# Patient Record
Sex: FEMALE | Race: WHITE | Employment: OTHER | URBAN - METROPOLITAN AREA
[De-identification: names, ages, dates, MRNs, and addresses within clinical notes are randomized per-mention and may not be internally consistent; named-entity substitution may affect disease eponyms.]

---

## 2021-12-23 ENCOUNTER — APPOINTMENT (OUTPATIENT)
Dept: ULTRASOUND IMAGING | Age: 71
End: 2021-12-23
Payer: MEDICARE

## 2021-12-23 ENCOUNTER — APPOINTMENT (OUTPATIENT)
Dept: GENERAL RADIOLOGY | Age: 71
End: 2021-12-23
Payer: MEDICARE

## 2021-12-23 ENCOUNTER — APPOINTMENT (OUTPATIENT)
Dept: CT IMAGING | Age: 71
End: 2021-12-23
Payer: MEDICARE

## 2021-12-23 ENCOUNTER — HOSPITAL ENCOUNTER (OUTPATIENT)
Age: 71
Setting detail: OBSERVATION
Discharge: NURSING, REHAB OR LONG-TERM CARE | End: 2021-12-27
Attending: STUDENT IN AN ORGANIZED HEALTH CARE EDUCATION/TRAINING PROGRAM | Admitting: HOSPITALIST
Payer: MEDICARE

## 2021-12-23 DIAGNOSIS — N30.00 ACUTE CYSTITIS WITHOUT HEMATURIA: ICD-10-CM

## 2021-12-23 DIAGNOSIS — R55 SYNCOPE AND COLLAPSE: Primary | ICD-10-CM

## 2021-12-23 LAB
ALBUMIN SERPL-MCNC: 2.9 GM/DL (ref 3.4–5)
ALP BLD-CCNC: 46 IU/L (ref 40–129)
ALT SERPL-CCNC: 10 U/L (ref 10–40)
ANION GAP SERPL CALCULATED.3IONS-SCNC: 8 MMOL/L (ref 4–16)
AST SERPL-CCNC: 13 IU/L (ref 15–37)
BASOPHILS ABSOLUTE: 0.1 K/CU MM
BASOPHILS RELATIVE PERCENT: 0.9 % (ref 0–1)
BILIRUB SERPL-MCNC: 0.4 MG/DL (ref 0–1)
BUN BLDV-MCNC: 12 MG/DL (ref 6–23)
CALCIUM SERPL-MCNC: 9 MG/DL (ref 8.3–10.6)
CHLORIDE BLD-SCNC: 104 MMOL/L (ref 99–110)
CO2: 23 MMOL/L (ref 21–32)
CREAT SERPL-MCNC: 0.9 MG/DL (ref 0.6–1.1)
DIFFERENTIAL TYPE: ABNORMAL
EOSINOPHILS ABSOLUTE: 0.1 K/CU MM
EOSINOPHILS RELATIVE PERCENT: 1.8 % (ref 0–3)
GFR AFRICAN AMERICAN: >60 ML/MIN/1.73M2
GFR NON-AFRICAN AMERICAN: >60 ML/MIN/1.73M2
GLUCOSE BLD-MCNC: 97 MG/DL (ref 70–99)
HCT VFR BLD CALC: 38.7 % (ref 37–47)
HEMOGLOBIN: 12.3 GM/DL (ref 12.5–16)
IMMATURE NEUTROPHIL %: 0.3 % (ref 0–0.43)
LACTATE: 1.1 MMOL/L (ref 0.4–2)
LYMPHOCYTES ABSOLUTE: 2.5 K/CU MM
LYMPHOCYTES RELATIVE PERCENT: 33.2 % (ref 24–44)
MCH RBC QN AUTO: 29.5 PG (ref 27–31)
MCHC RBC AUTO-ENTMCNC: 31.8 % (ref 32–36)
MCV RBC AUTO: 92.8 FL (ref 78–100)
MONOCYTES ABSOLUTE: 0.6 K/CU MM
MONOCYTES RELATIVE PERCENT: 7.4 % (ref 0–4)
NUCLEATED RBC %: 0 %
PDW BLD-RTO: 12.7 % (ref 11.7–14.9)
PLATELET # BLD: 214 K/CU MM (ref 140–440)
PMV BLD AUTO: 9.7 FL (ref 7.5–11.1)
POTASSIUM SERPL-SCNC: 3.8 MMOL/L (ref 3.5–5.1)
PRO-BNP: 1025 PG/ML
RBC # BLD: 4.17 M/CU MM (ref 4.2–5.4)
SEGMENTED NEUTROPHILS ABSOLUTE COUNT: 4.3 K/CU MM
SEGMENTED NEUTROPHILS RELATIVE PERCENT: 56.4 % (ref 36–66)
SODIUM BLD-SCNC: 135 MMOL/L (ref 135–145)
TOTAL IMMATURE NEUTOROPHIL: 0.02 K/CU MM
TOTAL NUCLEATED RBC: 0 K/CU MM
TOTAL PROTEIN: 5.9 GM/DL (ref 6.4–8.2)
TROPONIN T: <0.01 NG/ML
TSH HIGH SENSITIVITY: 0.04 UIU/ML (ref 0.27–4.2)
WBC # BLD: 7.6 K/CU MM (ref 4–10.5)

## 2021-12-23 PROCEDURE — 85025 COMPLETE CBC W/AUTO DIFF WBC: CPT

## 2021-12-23 PROCEDURE — 84443 ASSAY THYROID STIM HORMONE: CPT

## 2021-12-23 PROCEDURE — 72125 CT NECK SPINE W/O DYE: CPT

## 2021-12-23 PROCEDURE — 96372 THER/PROPH/DIAG INJ SC/IM: CPT

## 2021-12-23 PROCEDURE — G0378 HOSPITAL OBSERVATION PER HR: HCPCS

## 2021-12-23 PROCEDURE — 70450 CT HEAD/BRAIN W/O DYE: CPT

## 2021-12-23 PROCEDURE — 87040 BLOOD CULTURE FOR BACTERIA: CPT

## 2021-12-23 PROCEDURE — 83880 ASSAY OF NATRIURETIC PEPTIDE: CPT

## 2021-12-23 PROCEDURE — 84484 ASSAY OF TROPONIN QUANT: CPT

## 2021-12-23 PROCEDURE — 93880 EXTRACRANIAL BILAT STUDY: CPT

## 2021-12-23 PROCEDURE — 6360000002 HC RX W HCPCS: Performed by: HOSPITALIST

## 2021-12-23 PROCEDURE — 99285 EMERGENCY DEPT VISIT HI MDM: CPT

## 2021-12-23 PROCEDURE — 80053 COMPREHEN METABOLIC PANEL: CPT

## 2021-12-23 PROCEDURE — 83605 ASSAY OF LACTIC ACID: CPT

## 2021-12-23 PROCEDURE — 93005 ELECTROCARDIOGRAM TRACING: CPT | Performed by: PHYSICIAN ASSISTANT

## 2021-12-23 PROCEDURE — 71045 X-RAY EXAM CHEST 1 VIEW: CPT

## 2021-12-23 RX ORDER — ASPIRIN 81 MG/1
81 TABLET ORAL DAILY
COMMUNITY

## 2021-12-23 RX ORDER — ACETAMINOPHEN 650 MG/1
650 SUPPOSITORY RECTAL EVERY 6 HOURS PRN
Status: DISCONTINUED | OUTPATIENT
Start: 2021-12-23 | End: 2021-12-27 | Stop reason: HOSPADM

## 2021-12-23 RX ORDER — ONDANSETRON 2 MG/ML
4 INJECTION INTRAMUSCULAR; INTRAVENOUS EVERY 6 HOURS PRN
Status: DISCONTINUED | OUTPATIENT
Start: 2021-12-23 | End: 2021-12-27 | Stop reason: HOSPADM

## 2021-12-23 RX ORDER — SODIUM CHLORIDE 9 MG/ML
25 INJECTION, SOLUTION INTRAVENOUS PRN
Status: DISCONTINUED | OUTPATIENT
Start: 2021-12-23 | End: 2021-12-27 | Stop reason: HOSPADM

## 2021-12-23 RX ORDER — SODIUM CHLORIDE 0.9 % (FLUSH) 0.9 %
5-40 SYRINGE (ML) INJECTION EVERY 12 HOURS SCHEDULED
Status: DISCONTINUED | OUTPATIENT
Start: 2021-12-23 | End: 2021-12-27 | Stop reason: HOSPADM

## 2021-12-23 RX ORDER — POLYETHYLENE GLYCOL 3350 17 G/17G
17 POWDER, FOR SOLUTION ORAL DAILY PRN
Status: DISCONTINUED | OUTPATIENT
Start: 2021-12-23 | End: 2021-12-27 | Stop reason: HOSPADM

## 2021-12-23 RX ORDER — ONDANSETRON 4 MG/1
4 TABLET, ORALLY DISINTEGRATING ORAL EVERY 8 HOURS PRN
Status: DISCONTINUED | OUTPATIENT
Start: 2021-12-23 | End: 2021-12-27 | Stop reason: HOSPADM

## 2021-12-23 RX ORDER — ACETAMINOPHEN 325 MG/1
650 TABLET ORAL EVERY 6 HOURS PRN
Status: DISCONTINUED | OUTPATIENT
Start: 2021-12-23 | End: 2021-12-27 | Stop reason: HOSPADM

## 2021-12-23 RX ORDER — TRAZODONE HYDROCHLORIDE 50 MG/1
50 TABLET ORAL NIGHTLY
COMMUNITY

## 2021-12-23 RX ORDER — PRAVASTATIN SODIUM 40 MG
40 TABLET ORAL DAILY
COMMUNITY

## 2021-12-23 RX ORDER — ASPIRIN 81 MG/1
81 TABLET ORAL DAILY
Status: DISCONTINUED | OUTPATIENT
Start: 2021-12-24 | End: 2021-12-27 | Stop reason: HOSPADM

## 2021-12-23 RX ORDER — TRAZODONE HYDROCHLORIDE 50 MG/1
50 TABLET ORAL NIGHTLY PRN
Status: DISCONTINUED | OUTPATIENT
Start: 2021-12-23 | End: 2021-12-27 | Stop reason: HOSPADM

## 2021-12-23 RX ORDER — PRAVASTATIN SODIUM 10 MG
40 TABLET ORAL NIGHTLY
Status: DISCONTINUED | OUTPATIENT
Start: 2021-12-24 | End: 2021-12-27 | Stop reason: HOSPADM

## 2021-12-23 RX ORDER — SODIUM CHLORIDE 0.9 % (FLUSH) 0.9 %
5-40 SYRINGE (ML) INJECTION PRN
Status: DISCONTINUED | OUTPATIENT
Start: 2021-12-23 | End: 2021-12-27 | Stop reason: HOSPADM

## 2021-12-23 RX ORDER — LEVOTHYROXINE SODIUM 137 UG/1
137 TABLET ORAL DAILY
Status: ON HOLD | COMMUNITY
End: 2021-12-27 | Stop reason: HOSPADM

## 2021-12-23 RX ADMIN — ENOXAPARIN SODIUM 40 MG: 100 INJECTION SUBCUTANEOUS at 19:58

## 2021-12-23 ASSESSMENT — ENCOUNTER SYMPTOMS
STRIDOR: 0
COUGH: 0
ABDOMINAL PAIN: 0
BACK PAIN: 0
RHINORRHEA: 0
PHOTOPHOBIA: 0
VOMITING: 1
SHORTNESS OF BREATH: 0

## 2021-12-23 NOTE — H&P
History and Physical      Name:  Sariah Oconnor /Age/Sex: 1950  (73 y.o. female)   MRN & CSN:  [id number] & [id number] Admission Date/Time: 2021  2:49 PM   Location:  [place] [address] PCP: No primary care provider on file. Hospital Day: 1    Assessment and Plan:   Sariah Oconnor is a 73 y.o.  female  who presents with syncope    1. Syncope may be vasovagal/orthostatic hypotension/possible arrhythmias  Systolic blood pressure noted to be in the 80s prior to presentation  Received IV fluid bolus on the ER--blood pressure currently stable  CTA reviewed-no acute findings noted, troponin negative  -2D echocardiogram ordered  -Hold off further IV fluids due to slight interstitial edema noted on chest x-ray  -Continue telemetry monitoring  -Monitor orthostatic blood pressure     2. Generalized weakness may be physical deconditioning  UA pending. PT/OT evaluation    3. Hypothyroidism-continue levothyroxine    4. Hyperlipidemia-continue statins    Diet ADULT DIET; Regular   DVT Prophylaxis [x] Lovenox, []  Heparin, [] SCDs, [] Ambulation   GI Prophylaxis [] PPI,  [x] H2 Blocker,  [] Carafate,  [] Diet/Tube Feeds   Code Status Full Code   Disposition Patient requires continued admission due to syncope   MDM [x] Low, [] Moderate,[]  High       History of Present Illness:     Chief Complaint:    Sariah Oconnor is a 73 y.o.  female with past medical history of hypothyroidism, hyperlipidemia, currently in a skilled nursing facility who was brought to the emergency room due to syncope. Patient complains of generalized weakness which started few days ago and progressively worse with associated nausea. Patient states she was smoking cigarettes, became lethargic passed out for few minutes which was witnessed. She complains of a slight cough but denies any chest pain, shortness of breath, fever/chills, palpitations, diaphoresis, headaches. EMS noted patient was hypotensive with blood pressures in the 80s.   She denies similar episodes in the past.  At the emergency room, vital signs were stable, CTA and CT cervical spine showed no acute findings, chest x-ray showed cardiomegaly with mild interstitial edema/questionable small left pleural effusion. She will be admitted under observation for further evaluation. Ten point ROS reviewed negative, unless as noted above    Objective:   No intake or output data in the 24 hours ending 21 1847   Vitals:   Vitals:    21 1832   BP: 107/62   Pulse: 63   Resp: 16   Temp:    SpO2:      Physical Exam:   GEN Awake female,  no apparent distress. EYES Pupils are equally round. No scleral erythema  HENT Mucous membranes are moist. Oral pharynx without exudates  NECK Supple  RESP Slight crackles at the lung , no wheezes, rales or rhonchi. CARDIO/VASC S1/S2 auscultated. Regular rate, No murmurs, rubs, or gallops. Peripheral pulses equal bilaterally and palpable. No peripheral edema. GI Abdomen is soft without significant tenderness, masses, or guarding. Bowel sounds present   No costovertebral angle tenderness. HEME/LYMPH No hepatosplenomegaly. MSK No gross joint deformities. SKIN Normal coloration, warm, dry. NEURO Cranial nerves appear grossly intact, normal speech, no lateralizing weakness. PSYCH Awake, alert, oriented x 4. Past Medical History:      Past Medical History:   Diagnosis Date    Hyperlipidemia     Insomnia     Thyroid disease     hypo     PSHX:  has a past surgical history that includes  section and brain surgery. Allergies: Allergies   Allergen Reactions    Epinephrine Hives    Tartrazine Itching    Dye [Iodides] Hives    Other      Phenergen       FAM HX: family history is not on file.   Soc HX:   Social History     Socioeconomic History    Marital status: Single     Spouse name: None    Number of children: None    Years of education: None    Highest education level: None   Occupational History    None   Tobacco Use  Smoking status: Current Every Day Smoker     Packs/day: 1.00     Types: Cigarettes    Smokeless tobacco: Never Used   Vaping Use    Vaping Use: Never used   Substance and Sexual Activity    Alcohol use: Not Currently    Drug use: Not Currently    Sexual activity: None       Medications:   Medications:    [START ON 12/24/2021] aspirin  81 mg Oral Daily    [START ON 12/24/2021] levothyroxine  137 mcg Oral Daily    [START ON 12/24/2021] pravastatin  40 mg Oral Daily    sodium chloride flush  5-40 mL IntraVENous 2 times per day    enoxaparin  40 mg SubCUTAneous Daily      Infusions:    sodium chloride       PRN Meds: traZODone, 50 mg, Nightly PRN  sodium chloride flush, 5-40 mL, PRN  sodium chloride, 25 mL, PRN  ondansetron, 4 mg, Q8H PRN   Or  ondansetron, 4 mg, Q6H PRN  polyethylene glycol, 17 g, Daily PRN  acetaminophen, 650 mg, Q6H PRN   Or  acetaminophen, 650 mg, Q6H PRN          Electronically signed by Oma Rai MD on 12/23/2021 at 6:47 PM

## 2021-12-23 NOTE — ED NOTES
Pt states she was outside and started feeling very \"lethargic\". Pt denies syncope or fall/injury.       Lalit Tan RN  12/23/21 9627

## 2021-12-24 LAB
AMORPHOUS: ABNORMAL /HPF
ANION GAP SERPL CALCULATED.3IONS-SCNC: 9 MMOL/L (ref 4–16)
BACTERIA: ABNORMAL /HPF
BILIRUBIN URINE: NEGATIVE MG/DL
BLOOD, URINE: ABNORMAL
BUN BLDV-MCNC: 12 MG/DL (ref 6–23)
CALCIUM SERPL-MCNC: 8.9 MG/DL (ref 8.3–10.6)
CHLORIDE BLD-SCNC: 107 MMOL/L (ref 99–110)
CLARITY: ABNORMAL
CO2: 23 MMOL/L (ref 21–32)
COLOR: YELLOW
CREAT SERPL-MCNC: 0.8 MG/DL (ref 0.6–1.1)
EKG ATRIAL RATE: 53 BPM
EKG DIAGNOSIS: NORMAL
EKG P AXIS: 11 DEGREES
EKG P-R INTERVAL: 154 MS
EKG Q-T INTERVAL: 440 MS
EKG QRS DURATION: 80 MS
EKG QTC CALCULATION (BAZETT): 412 MS
EKG R AXIS: 39 DEGREES
EKG T AXIS: 16 DEGREES
EKG VENTRICULAR RATE: 53 BPM
GFR AFRICAN AMERICAN: >60 ML/MIN/1.73M2
GFR NON-AFRICAN AMERICAN: >60 ML/MIN/1.73M2
GLUCOSE BLD-MCNC: 85 MG/DL (ref 70–99)
GLUCOSE, URINE: NEGATIVE MG/DL
HCT VFR BLD CALC: 40 % (ref 37–47)
HEMOGLOBIN: 13 GM/DL (ref 12.5–16)
KETONES, URINE: NEGATIVE MG/DL
LEUKOCYTE ESTERASE, URINE: ABNORMAL
MCH RBC QN AUTO: 29.3 PG (ref 27–31)
MCHC RBC AUTO-ENTMCNC: 32.5 % (ref 32–36)
MCV RBC AUTO: 90.1 FL (ref 78–100)
MUCUS: ABNORMAL HPF
NITRITE URINE, QUANTITATIVE: POSITIVE
PDW BLD-RTO: 12.7 % (ref 11.7–14.9)
PH, URINE: 7.5 (ref 5–8)
PLATELET # BLD: 215 K/CU MM (ref 140–440)
PMV BLD AUTO: 9.7 FL (ref 7.5–11.1)
POTASSIUM SERPL-SCNC: 4.2 MMOL/L (ref 3.5–5.1)
PROTEIN UA: NEGATIVE MG/DL
RBC # BLD: 4.44 M/CU MM (ref 4.2–5.4)
RBC URINE: 2 /HPF (ref 0–6)
SODIUM BLD-SCNC: 139 MMOL/L (ref 135–145)
SPECIFIC GRAVITY UA: 1.01 (ref 1–1.03)
T4 FREE: 2.24 NG/DL (ref 0.9–1.8)
TRICHOMONAS: ABNORMAL /HPF
UROBILINOGEN, URINE: NEGATIVE MG/DL (ref 0.2–1)
WBC # BLD: 6.5 K/CU MM (ref 4–10.5)
WBC UA: 2 /HPF (ref 0–5)

## 2021-12-24 PROCEDURE — G0378 HOSPITAL OBSERVATION PER HR: HCPCS

## 2021-12-24 PROCEDURE — 96365 THER/PROPH/DIAG IV INF INIT: CPT

## 2021-12-24 PROCEDURE — 97116 GAIT TRAINING THERAPY: CPT

## 2021-12-24 PROCEDURE — 6370000000 HC RX 637 (ALT 250 FOR IP): Performed by: HOSPITALIST

## 2021-12-24 PROCEDURE — 81001 URINALYSIS AUTO W/SCOPE: CPT

## 2021-12-24 PROCEDURE — 84439 ASSAY OF FREE THYROXINE: CPT

## 2021-12-24 PROCEDURE — 97530 THERAPEUTIC ACTIVITIES: CPT

## 2021-12-24 PROCEDURE — 2580000003 HC RX 258: Performed by: HOSPITALIST

## 2021-12-24 PROCEDURE — 94761 N-INVAS EAR/PLS OXIMETRY MLT: CPT

## 2021-12-24 PROCEDURE — 2580000003 HC RX 258: Performed by: NURSE PRACTITIONER

## 2021-12-24 PROCEDURE — 36415 COLL VENOUS BLD VENIPUNCTURE: CPT

## 2021-12-24 PROCEDURE — 96372 THER/PROPH/DIAG INJ SC/IM: CPT

## 2021-12-24 PROCEDURE — 80048 BASIC METABOLIC PNL TOTAL CA: CPT

## 2021-12-24 PROCEDURE — 6360000002 HC RX W HCPCS: Performed by: HOSPITALIST

## 2021-12-24 PROCEDURE — 85027 COMPLETE CBC AUTOMATED: CPT

## 2021-12-24 PROCEDURE — 93010 ELECTROCARDIOGRAM REPORT: CPT | Performed by: INTERNAL MEDICINE

## 2021-12-24 PROCEDURE — 6360000002 HC RX W HCPCS: Performed by: NURSE PRACTITIONER

## 2021-12-24 PROCEDURE — 97162 PT EVAL MOD COMPLEX 30 MIN: CPT

## 2021-12-24 RX ADMIN — ASPIRIN 81 MG: 81 TABLET, COATED ORAL at 08:58

## 2021-12-24 RX ADMIN — PRAVASTATIN SODIUM 40 MG: 10 TABLET ORAL at 21:24

## 2021-12-24 RX ADMIN — SODIUM CHLORIDE, PRESERVATIVE FREE 10 ML: 5 INJECTION INTRAVENOUS at 09:01

## 2021-12-24 RX ADMIN — ENOXAPARIN SODIUM 40 MG: 100 INJECTION SUBCUTANEOUS at 08:58

## 2021-12-24 RX ADMIN — SODIUM CHLORIDE 25 ML: 9 INJECTION, SOLUTION INTRAVENOUS at 18:09

## 2021-12-24 RX ADMIN — LEVOTHYROXINE SODIUM 137 MCG: 0.03 TABLET ORAL at 08:58

## 2021-12-24 RX ADMIN — CEFTRIAXONE SODIUM 1000 MG: 1 INJECTION, POWDER, FOR SOLUTION INTRAMUSCULAR; INTRAVENOUS at 18:11

## 2021-12-24 NOTE — ED NOTES
Report called to nurse Belkis Dejesus for room 4104     [facility]  12/23/21 2050

## 2021-12-24 NOTE — CONSULTS
[address], 1950, 4104/4104-A, 2021    History  Council:  The encounter diagnosis was Syncope and collapse. Patient  has a past medical history of Hyperlipidemia, Insomnia, and Thyroid disease. Patient  has a past surgical history that includes  section and brain surgery. Subjective:  Patient states: \"Oh yeah I'll walk. \"    Pain:  Denies pain. Communication with other providers:  Handoff to RN  Restrictions: general precautions, fall risk    Home Setup/Prior level of function   Pt states she is from a senior living facility where she utilizes a rollator     Examination of body systems (includes body structures/functions, activity/participation limitations):  · Observation:  Pt supine in bed with RN in room upon arrival and agreeable to therapy  · [facility]  · [facility]  · Cardiopulmonary:  No O2 needs  · Cognition:  A&O x3, see OT/SLP note for further evaluation. Musculoskeletal  · ROM R/L:  WFL. · Strength R/L:  4/5, minimalimpairment in function and endurance. · Neuro:  [facility]      Mobility:  · Rolling L/R:  Mod I  · Supine <-> sit:  Mod I, performed x2 times during session  · Transfers: Pt completed STS to/from bed x2 trials and to/from commode SBA  · Sitting balance:  good. · Standing balance:  Fair+. · Gait: Pt ambulated 80' SBA without a device with one LOB requiring CGA to recover. Pt ambulated with decreased jackson but no LOB or dizziness throughout. Pt seems to be ambulating at baseline if had Rollator. [facility] 6 Clicks Inpatient Mobility:  AM-PAC Inpatient Mobility Raw Score : 20    Safety: patient left supine in bed with alarm on, call light within reach, RN notified, gait belt used. Assessment:  Pt is a 73 y.o. female admitted to the hospital for syncope and collapse. Pt is typically mod I with Rollator for all ambulation and transfers.  Pt is currently performing bed mobility mod I, transferring SBA, and ambulating 80' without a device CGA. Pt seems to be functioning close to baseline and does not require any further acute care PT at this time. Pt will be discharged from caseload. Complexity: moderate    Prognosis: Good, no significant barriers to participation at this time.      Equipment: none, pt owns all necessary equipment    Recommendations for NURSING mobility: amb with gait belt and RW    Time:   Time in: 0848  Time out: 0920  Timed treatment minutes: 23  Total time: 32    Electronically signed by:    Jess Kat PT  12/24/2021, 10:06 AM

## 2021-12-24 NOTE — PROGRESS NOTES
UA resulted with positive nitrates small blood small leukocytes. Start Rocephin 1 g daily IV. Follow urine culture.

## 2021-12-24 NOTE — ED PROVIDER NOTES
As physician-in-triage, I performed a medical screening history and physical exam on this patient. I also cared for and evaluated the patient in conjunction with the ED Advanced Practice Provider    HISTORY OF PRESENT ILLNESS  Ursula Ortega is a 73 y.o. female presenting after syncopal episode at assisted living facility. Patient was apparently on the smoking patio and had a syncopal episode. When EMS arrived patient's blood pressure was 80 systolic per report. On presentation to the ED patient no longer hypotensive. On my evaluation patient does have some mildly slurred speech but patient states that his due to dental issues and in discussion with nursing facility by PA this is baseline for patient. Patient denies any other focal deficits and nursing facility denied it noticed any strokelike symptoms. They did not notice any seizure activity either. Patient denies any symptoms to me currently including chest pain, shortness of breath, abdominal pain, change in urination, change in bowel habits. PHYSICAL EXAM  /65   Pulse 56   Temp 97 °F (36.1 °C)   Resp 20   Ht 5' 6\" (1.676 m)   Wt 207 lb (93.9 kg)   SpO2 96%   BMI 33.41 kg/m²     On exam, the patient appears in no acute distress. Speech is clear. Breathing is unlabored. Moves all extremities    All diagnostic, treatment, and disposition decisions were made by myself in conjunction with the advanced practice provider. EKG:  Sinus bradycardia, ventricular rate 53, NV interval 154, QRS duration 80, QTc 412, low voltage QRS, T wave flattening diffusely, no significant ST elevation or depression    For all further details of the patient's emergency department visit, please see the advanced practice provider's documentation.     Comment: Please note this report has been produced using speech recognition software and may contain errors related to that system including errors in grammar, punctuation, and spelling, as well as words and phrases that may be inappropriate. If there are any questions or concerns please feel free to contact the dictating provider for clarification.         Gabriela Lombardo MD  12/31/21 7907

## 2021-12-24 NOTE — PROGRESS NOTES
Hospitalist Progress Note      Name:  Lorrayne Opitz /Age/Sex: 1950  (73 y.o. female)   MRN & CSN:  [id number] & [id number] Admission Date/Time: 2021  2:49 PM   Location:  [address] [place] [address]-A PCP: No primary care provider on file. Hospital Day: 2    Assessment and Plan:   Lorrayne Opitz is a 73 y.o.  female  who presents with generalized weakness and reported syncopal episode to [facility]    1. Syncope may be vasovagal/orthostatic hypotension/possible arrhythmias. Systolic blood pressure this morning 122/57. On admission-Systolic blood pressure noted to be in the 80s prior to presentation  Received IV fluid bolus on the ER-  CTA reviewed-no acute findings noted, troponin negative  -2D echocardiogram-pending  -Carotid Doppler- no stenosis  -Hold off further IV fluids due to slight interstitial edema noted on chest x-ray  -Continue telemetry monitoring  -Monitor orthostatic blood pressure     2. Generalized weakness may be physical deconditioning  UA pending. PT/OT evaluation  Fall precautions     3. Hypothyroidism-TSH 0.041-Free T4 pending  -continue levothyroxine currently on 137 mcg daily     4. Hyperlipidemia-continue statins    BMI: 33.34-lifestyle modifications      Diet: ADULT DIET; Regular  DVT prophylaxis: Lovenox  GI prophylaxis H2 blocker  CODE STATUS:Full Code    Treatment and discharge plan discussed with patient/family. Patient/family voiced understanding. This patient was seen examined and treatment plan discussed with/supervising physician. A hospitalist attending physician was available for questions/consultation as needed          History of Present Illness:     Chief Complaint: Generalized weakness and reported syncopal episode from [facility]. Subjective  Sitting up in bed. Patient reports she has felt lethargic and very weak when she felt like she was going to pass out yesterday. Patient denies any chest pain or palpitations. Denies any headache or vision changes.   Denies any nausea, vomiting or abdominal pain. Patient sitting up in bed eating lunch. Patient denies any new concerns    ROS reviewed negative, unless as noted above    Objective:   No intake or output data in the 24 hours ending 12/24/21 1428   Vitals:   Vitals:    12/24/21 0903   BP: (!) 122/57   Pulse: 62   Resp: 18   Temp: 98.1 °F (36.7 °C)   SpO2: 93%     Physical Exam:   Physical Exam  Constitutional:       Appearance: Normal appearance. HENT:      Head: Normocephalic. Mouth/Throat:      Mouth: Mucous membranes are moist.   Eyes:      Extraocular Movements: Extraocular movements intact. Conjunctiva/sclera: Conjunctivae normal.   Cardiovascular:      Rate and Rhythm: Normal rate and regular rhythm. Pulses: Normal pulses. Heart sounds: Normal heart sounds. Pulmonary:      Effort: Pulmonary effort is normal.      Breath sounds: Normal breath sounds. Abdominal:      General: Abdomen is flat. Palpations: Abdomen is soft. Skin:     General: Skin is warm and dry. Neurological:      General: No focal deficit present. Mental Status: She is alert and oriented to person, place, and time.    Psychiatric:         Mood and Affect: Mood normal.         Medications:   Medications:    aspirin  81 mg Oral Daily    levothyroxine  137 mcg Oral Daily    pravastatin  40 mg Oral Nightly    sodium chloride flush  5-40 mL IntraVENous 2 times per day    enoxaparin  40 mg SubCUTAneous Daily      Infusions:    sodium chloride       PRN Meds: traZODone, 50 mg, Nightly PRN  sodium chloride flush, 5-40 mL, PRN  sodium chloride, 25 mL, PRN  ondansetron, 4 mg, Q8H PRN   Or  ondansetron, 4 mg, Q6H PRN  polyethylene glycol, 17 g, Daily PRN  acetaminophen, 650 mg, Q6H PRN   Or  acetaminophen, 650 mg, Q6H PRN        Recent Labs     12/23/21  1502 12/24/21  0406   WBC 7.6 6.5   HGB 12.3* 13.0   HCT 38.7 40.0    215      Recent Labs     12/23/21  1502 12/24/21  0406    139   K 3.8 4.2    107 CO2 23 23   BUN 12 12   CREATININE 0.9 0.8     Recent Labs     12/23/21  1502   AST 13*   ALT 10   BILITOT 0.4   ALKPHOS 46     No results for input(s): INR in the last 72 hours.   Recent Labs     12/23/21  1502   TROPONINT <0.010        Imaging reviewed      Electronically signed by Vista Oppenheim, APRN - CNP on 12/24/2021 at 2:28 PM

## 2021-12-25 LAB
ANION GAP SERPL CALCULATED.3IONS-SCNC: 10 MMOL/L (ref 4–16)
BUN BLDV-MCNC: 13 MG/DL (ref 6–23)
CALCIUM SERPL-MCNC: 8.9 MG/DL (ref 8.3–10.6)
CHLORIDE BLD-SCNC: 104 MMOL/L (ref 99–110)
CO2: 23 MMOL/L (ref 21–32)
CREAT SERPL-MCNC: 0.9 MG/DL (ref 0.6–1.1)
GFR AFRICAN AMERICAN: >60 ML/MIN/1.73M2
GFR NON-AFRICAN AMERICAN: >60 ML/MIN/1.73M2
GLUCOSE BLD-MCNC: 90 MG/DL (ref 70–99)
POTASSIUM SERPL-SCNC: 4.2 MMOL/L (ref 3.5–5.1)
SODIUM BLD-SCNC: 137 MMOL/L (ref 135–145)

## 2021-12-25 PROCEDURE — 99213 OFFICE O/P EST LOW 20 MIN: CPT | Performed by: INTERNAL MEDICINE

## 2021-12-25 PROCEDURE — 80048 BASIC METABOLIC PNL TOTAL CA: CPT

## 2021-12-25 PROCEDURE — 6370000000 HC RX 637 (ALT 250 FOR IP): Performed by: HOSPITALIST

## 2021-12-25 PROCEDURE — 96376 TX/PRO/DX INJ SAME DRUG ADON: CPT

## 2021-12-25 PROCEDURE — 2580000003 HC RX 258: Performed by: HOSPITALIST

## 2021-12-25 PROCEDURE — 76937 US GUIDE VASCULAR ACCESS: CPT

## 2021-12-25 PROCEDURE — G0378 HOSPITAL OBSERVATION PER HR: HCPCS

## 2021-12-25 PROCEDURE — 36415 COLL VENOUS BLD VENIPUNCTURE: CPT

## 2021-12-25 PROCEDURE — 6360000002 HC RX W HCPCS: Performed by: HOSPITALIST

## 2021-12-25 PROCEDURE — 2580000003 HC RX 258: Performed by: NURSE PRACTITIONER

## 2021-12-25 PROCEDURE — 94761 N-INVAS EAR/PLS OXIMETRY MLT: CPT

## 2021-12-25 PROCEDURE — 6360000002 HC RX W HCPCS: Performed by: NURSE PRACTITIONER

## 2021-12-25 PROCEDURE — 96372 THER/PROPH/DIAG INJ SC/IM: CPT

## 2021-12-25 RX ORDER — LEVOTHYROXINE SODIUM 0.1 MG/1
100 TABLET ORAL DAILY
Status: DISCONTINUED | OUTPATIENT
Start: 2021-12-26 | End: 2021-12-27 | Stop reason: HOSPADM

## 2021-12-25 RX ADMIN — ASPIRIN 81 MG: 81 TABLET, COATED ORAL at 09:43

## 2021-12-25 RX ADMIN — LEVOTHYROXINE SODIUM 137 MCG: 0.03 TABLET ORAL at 09:43

## 2021-12-25 RX ADMIN — PRAVASTATIN SODIUM 40 MG: 10 TABLET ORAL at 20:56

## 2021-12-25 RX ADMIN — CEFTRIAXONE SODIUM 1000 MG: 1 INJECTION, POWDER, FOR SOLUTION INTRAMUSCULAR; INTRAVENOUS at 16:41

## 2021-12-25 RX ADMIN — ENOXAPARIN SODIUM 40 MG: 100 INJECTION SUBCUTANEOUS at 09:42

## 2021-12-25 RX ADMIN — SODIUM CHLORIDE, PRESERVATIVE FREE 10 ML: 5 INJECTION INTRAVENOUS at 09:43

## 2021-12-25 NOTE — PROGRESS NOTES
Hospitalist Progress Note      Name:  Clifford Rich /Age/Sex: 1950  (73 y.o. female)   MRN & CSN:  [id number] & [id number] Admission Date/Time: 2021  2:49 PM   Location:  [address] [place] [address]-A PCP: No primary care provider on file. Hospital Day: 3    Assessment and Plan:     Clifford Rich is a 73 y.o.  female  who presents with generalized weakness and reported syncopal episode to [facility]     1. Syncope may be vasovagal/orthostatic hypotension/possible arrhythmias. Systolic blood pressure this morning 122/57. Blood pressures have remained stable since admission. Received IV fluid bolus on the ER-  CTA reviewed-no acute findings noted, troponin negative  -2D echocardiogram-pending ()  -Carotid Doppler- no stenosis  -Hold off further IV fluids due to slight interstitial edema noted on chest x-ray  -Continue telemetry monitoring  -Monitor orthostatic blood pressure  -Cardiology consulted, appreciate assistance     2.  Generalized weakness may be physical deconditioning. Patient was evaluated by PT was discharged from caseload this patient appears to be functioning close to her baseline. Patient is a resident of a senior assisted living. UA results-positive nitrates small leukocytes small blood  . Fall precautions    3. Acute cystitis with hematuria: UA resulted positive nitrates, small leukocytes small blood  -Rocephin 1 g daily  -Follow urine culture     4. Hypothyroidism-TSH 0.041-Free T4 pending  -continue levothyroxine currently on 137 mcg daily     5.  Hyperlipidemia-continue statins     BMI: 33.34-lifestyle modifications        Diet: ADULT DIET; Regular  DVT prophylaxis: Lovenox  GI prophylaxis H2 blocker  CODE STATUS:Full Code    Dispo: Anticipate discharge in the morning    Treatment and discharge plan discussed with patient/family. Patient/family voiced understanding. This patient was seen examined and treatment plan discussed with/supervising physician.   A hospitalist attending physician was available for questions/consultation as needed      History of Present Illness:     Chief Complaint: Generalized weakness    Subjective  Patient examined at bedside this morning. She was eating breakfast that was 89% continued. She denies any new concerns this morning. Patient reports she is feeling better and does not feel as weak. Discussed with patient about her urine results and I started antibiotic for urinary tract infection. Patient states she lives at a senior living assisted facility. ROS reviewed negative, unless as noted above    Objective:   No intake or output data in the 24 hours ending 12/25/21 1319   Vitals:   Vitals:    12/25/21 1245   BP: 87/62   Pulse: 54   Resp: 18   Temp: 97.7 °F (36.5 °C)   SpO2: 95%     Physical Exam:   Physical Exam  Constitutional:       Appearance: She is obese. HENT:      Mouth/Throat:      Mouth: Mucous membranes are moist.   Eyes:      Extraocular Movements: Extraocular movements intact. Conjunctiva/sclera: Conjunctivae normal.   Cardiovascular:      Rate and Rhythm: Normal rate and regular rhythm. Pulses: Normal pulses. Heart sounds: Normal heart sounds. Pulmonary:      Effort: Pulmonary effort is normal.      Breath sounds: Normal breath sounds. Abdominal:      Palpations: Abdomen is soft. Skin:     General: Skin is warm and dry. Neurological:      General: No focal deficit present. Mental Status: She is alert.    Psychiatric:         Mood and Affect: Mood normal.         Medications:   Medications:    [START ON 12/26/2021] levothyroxine  100 mcg Oral Daily    cefTRIAXone (ROCEPHIN) IV  1,000 mg IntraVENous Q24H    aspirin  81 mg Oral Daily    pravastatin  40 mg Oral Nightly    sodium chloride flush  5-40 mL IntraVENous 2 times per day    enoxaparin  40 mg SubCUTAneous Daily      Infusions:    sodium chloride 25 mL (12/24/21 6329)     PRN Meds: traZODone, 50 mg, Nightly PRN  sodium chloride flush, 5-40 mL, PRN  sodium chloride, 25 mL, PRN  ondansetron, 4 mg, Q8H PRN   Or  ondansetron, 4 mg, Q6H PRN  polyethylene glycol, 17 g, Daily PRN  acetaminophen, 650 mg, Q6H PRN   Or  acetaminophen, 650 mg, Q6H PRN        Recent Labs     12/23/21  1502 12/24/21  0406   WBC 7.6 6.5   HGB 12.3* 13.0   HCT 38.7 40.0    215      Recent Labs     12/23/21  1502 12/24/21  0406 12/25/21  0231    139 137   K 3.8 4.2 4.2    107 104   CO2 23 23 23   BUN 12 12 13   CREATININE 0.9 0.8 0.9     Recent Labs     12/23/21  1502   AST 13*   ALT 10   BILITOT 0.4   ALKPHOS 46     No results for input(s): INR in the last 72 hours.   Recent Labs     12/23/21  1502   TROPONINT <0.010        Imaging reviewed      Electronically signed by RADHA Rowland CNP on 12/25/2021 at 1:19 PM

## 2021-12-25 NOTE — CONSULTS
Chart eviewed  Full note to follow                      Name:  Slim Christiansen /Age/Sex: 1950  (73 y.o. female)   MRN & CSN:  [id number] & [id number] Admission Date/Time: 2021  2:49 PM   Location:  [address] [place] [address] PCP: No primary care provider on file. Hospital Day: 3          Referring physician:  Benjamín Mchugh MD         Reason for consultation:  Near syncope        Thanks for referral.    Information source: patient    CC;  Passed out      HPI:   Thank you for involving me in taking  care of Slim Christiansen who  is a 73 y. o.year  Old female  Presents with  hypothyroidism, hyperlipidemia, currently in a skilled nursing facility who was brought to the emergency room due to syncope. Patient complains of generalized weakness which started few days ago and progressively worse with associated nausea. Patient states she was smoking cigarettes, became lethargic passed out for few minutes which was witnessed. She complains of a slight cough but denies any chest pain, shortness of breath, fever/chills, palpitations, diaphoresis, headaches. EMS noted patient was hypotensive with blood pressures in the 80s. has no CP, SOB                     Past medical history:    has a past medical history of Hyperlipidemia, Insomnia, and Thyroid disease. Past surgical history:   has a past surgical history that includes  section and brain surgery. Social History:   reports that she has been smoking cigarettes. She has been smoking about 1.00 pack per day. She has never used smokeless tobacco. She reports previous alcohol use. She reports previous drug use. Family history:  family history is not on file.     Allergies   Allergen Reactions    Epinephrine Hives    Tartrazine Itching    Dye [Iodides] Hives    Other      Phenergen       [START ON 2021] levothyroxine (SYNTHROID) tablet 100 mcg, Daily  cefTRIAXone (ROCEPHIN) 1000 mg IVPB in 50 mL D5W minibag, Q24H  aspirin EC tablet 81 mg, Daily  pravastatin (PRAVACHOL) tablet 40 mg, Nightly  traZODone (DESYREL) tablet 50 mg, Nightly PRN  sodium chloride flush 0.9 % injection 5-40 mL, 2 times per day  sodium chloride flush 0.9 % injection 5-40 mL, PRN  0.9 % sodium chloride infusion, PRN  enoxaparin (LOVENOX) injection 40 mg, Daily  ondansetron (ZOFRAN-ODT) disintegrating tablet 4 mg, Q8H PRN   Or  ondansetron (ZOFRAN) injection 4 mg, Q6H PRN  polyethylene glycol (GLYCOLAX) packet 17 g, Daily PRN  acetaminophen (TYLENOL) tablet 650 mg, Q6H PRN   Or  acetaminophen (TYLENOL) suppository 650 mg, Q6H PRN      Current Facility-Administered Medications   Medication Dose Route Frequency Provider Last Rate Last Admin    [START ON 12/26/2021] levothyroxine (SYNTHROID) tablet 100 mcg  100 mcg Oral Daily Vista Oppenheim, APRN - CNP        cefTRIAXone (ROCEPHIN) 1000 mg IVPB in 50 mL D5W minibag  1,000 mg IntraVENous Q24H Vista Oppenheim, APRN - CNP   Stopped at 12/24/21 1841    aspirin EC tablet 81 mg  81 mg Oral Daily Ulysses Boo MD   81 mg at 12/25/21 0943    pravastatin (PRAVACHOL) tablet 40 mg  40 mg Oral Nightly Ulysses Boo MD   40 mg at 12/24/21 2124    traZODone (DESYREL) tablet 50 mg  50 mg Oral Nightly PRN Ulysses Boo MD        sodium chloride flush 0.9 % injection 5-40 mL  5-40 mL IntraVENous 2 times per day Ulysses Boo MD   10 mL at 12/25/21 0943    sodium chloride flush 0.9 % injection 5-40 mL  5-40 mL IntraVENous PRN Ulysses Boo MD        0.9 % sodium chloride infusion  25 mL IntraVENous PRN Ulysses Boo  mL/hr at 12/24/21 1809 25 mL at 12/24/21 1809    enoxaparin (LOVENOX) injection 40 mg  40 mg SubCUTAneous Daily Ulysses Boo MD   40 mg at 12/25/21 0942    ondansetron (ZOFRAN-ODT) disintegrating tablet 4 mg  4 mg Oral Q8H PRN Ulysses Boo MD        Or    ondansetron (ZOFRAN) injection 4 mg  4 mg IntraVENous Q6H PRN Ulysses Boo MD        polyethylene glycol (GLYCOLAX) packet 17 g  17 g Oral Daily PRN Ulysses Boo MD        acetaminophen (TYLENOL) tablet 650 mg  650 mg Oral Q6H PRN Timoteo Chin MD        Or   Mata acetaminophen (TYLENOL) suppository 650 mg  650 mg Rectal Q6H PRN Timoteo Chin MD         Review of Systems:  All 14 systems reviewed, all negative except for  syncope    Physical Examination:    BP 87/62   Pulse 54   Temp 97.7 °F (36.5 °C)   Resp 18   Ht 5' 6\" (1.676 m)   Wt 206 lb 9.1 oz (93.7 kg)   SpO2 95%   BMI 33.34 kg/m²      Wt Readings from Last 3 Encounters:   12/24/21 206 lb 9.1 oz (93.7 kg)     Body mass index is 33.34 kg/m². General Appearance:  fair  Head: normocephalic     Eyes: normal, noninjected conjunctiva    ENT: normal mucosa, noninjected throat, normal     NECK: No JVP  No thyromegaly        Cardiovascular: No thrills palpated   Auscultation: Normal S1 and S2,  no murmur   carotid bruit no   Abdominal Aorta no bruit    Respiratory:    Breath sounds Clear = 0    Extremities:  none Edema clubbing ,   no cyanosis    SKIN: Warm and well perfused, no pallor or cyanosis    Vascular exam:  Pedal Pulses: palp  bilaterally        Abdomen:  No masses or tenderness. No organomegaly noted. Neurological:  Oriented to time, place, and person   No focal neurological deficit noted. Psychiatric:normal mood, no anxiety    Lab Review   Recent Labs     12/24/21  0406   WBC 6.5   HGB 13.0   HCT 40.0         Recent Labs     12/25/21  0231      K 4.2      CO2 23   BUN 13   CREATININE 0.9     Recent Labs     12/23/21  1502   AST 13*   ALT 10   BILITOT 0.4   ALKPHOS 46     No results for input(s): TROPONINI in the last 72 hours.   No results found for: BNP  No results found for: INR, PROTIME        Assessment/Recommendations:     - Syncope: check Orthos, Echo , Holter  Will FU         Maureen Kawasaki, MD, 12/25/2021 2:45 PM

## 2021-12-26 LAB
ANION GAP SERPL CALCULATED.3IONS-SCNC: 9 MMOL/L (ref 4–16)
BUN BLDV-MCNC: 14 MG/DL (ref 6–23)
CALCIUM SERPL-MCNC: 9.2 MG/DL (ref 8.3–10.6)
CHLORIDE BLD-SCNC: 106 MMOL/L (ref 99–110)
CO2: 24 MMOL/L (ref 21–32)
CREAT SERPL-MCNC: 0.9 MG/DL (ref 0.6–1.1)
GFR AFRICAN AMERICAN: >60 ML/MIN/1.73M2
GFR NON-AFRICAN AMERICAN: >60 ML/MIN/1.73M2
GLUCOSE BLD-MCNC: 91 MG/DL (ref 70–99)
POTASSIUM SERPL-SCNC: 4.2 MMOL/L (ref 3.5–5.1)
SODIUM BLD-SCNC: 139 MMOL/L (ref 135–145)

## 2021-12-26 PROCEDURE — 6360000002 HC RX W HCPCS: Performed by: HOSPITALIST

## 2021-12-26 PROCEDURE — APPSS60 APP SPLIT SHARED TIME 46-60 MINUTES: Performed by: NURSE PRACTITIONER

## 2021-12-26 PROCEDURE — 96366 THER/PROPH/DIAG IV INF ADDON: CPT

## 2021-12-26 PROCEDURE — 99214 OFFICE O/P EST MOD 30 MIN: CPT | Performed by: INTERNAL MEDICINE

## 2021-12-26 PROCEDURE — 80048 BASIC METABOLIC PNL TOTAL CA: CPT

## 2021-12-26 PROCEDURE — 2580000003 HC RX 258: Performed by: HOSPITALIST

## 2021-12-26 PROCEDURE — 96372 THER/PROPH/DIAG INJ SC/IM: CPT

## 2021-12-26 PROCEDURE — 6370000000 HC RX 637 (ALT 250 FOR IP): Performed by: HOSPITALIST

## 2021-12-26 PROCEDURE — 94761 N-INVAS EAR/PLS OXIMETRY MLT: CPT

## 2021-12-26 PROCEDURE — 6370000000 HC RX 637 (ALT 250 FOR IP): Performed by: NURSE PRACTITIONER

## 2021-12-26 PROCEDURE — 2580000003 HC RX 258: Performed by: NURSE PRACTITIONER

## 2021-12-26 PROCEDURE — 6360000002 HC RX W HCPCS: Performed by: NURSE PRACTITIONER

## 2021-12-26 PROCEDURE — 36415 COLL VENOUS BLD VENIPUNCTURE: CPT

## 2021-12-26 PROCEDURE — G0378 HOSPITAL OBSERVATION PER HR: HCPCS

## 2021-12-26 RX ADMIN — LEVOTHYROXINE SODIUM 100 MCG: 0.1 TABLET ORAL at 09:01

## 2021-12-26 RX ADMIN — SODIUM CHLORIDE, PRESERVATIVE FREE 10 ML: 5 INJECTION INTRAVENOUS at 09:02

## 2021-12-26 RX ADMIN — ENOXAPARIN SODIUM 40 MG: 100 INJECTION SUBCUTANEOUS at 09:01

## 2021-12-26 RX ADMIN — CEFTRIAXONE SODIUM 1000 MG: 1 INJECTION, POWDER, FOR SOLUTION INTRAMUSCULAR; INTRAVENOUS at 15:40

## 2021-12-26 RX ADMIN — ASPIRIN 81 MG: 81 TABLET, COATED ORAL at 09:01

## 2021-12-26 RX ADMIN — PRAVASTATIN SODIUM 40 MG: 10 TABLET ORAL at 21:22

## 2021-12-26 ASSESSMENT — ENCOUNTER SYMPTOMS: SHORTNESS OF BREATH: 0

## 2021-12-26 NOTE — PROGRESS NOTES
Cardiology Progress Note     Today's Plan: echo tomorrow     Admit Date:  2021    Consult reason/ Seen today for: syncope     Subjective and  Overnight Events:  Patient denies any more episodes of syncope or dizziness. Assessment / Plan / Recommendation:     1. Syncope /Dizziness : normal orthostatic B/P, recommend compression stockings, will get echo, sinus bradycardia (outpatient 2 week event monitor). No acute findings in CTA, carotid doppler. Likely, secondary to acute infection and deconditioning. 2. Dyslipidemia: continue statins  3. Acute cystitis: cultures pending, on antibiotics. 4. DVT prophylaxis if not contraindicated. History of Presenting Illness:    Chief complain on admission : 73 y. o.year old who is admitted for  Chief Complaint   Patient presents with    Hypotension     80s over 46s        Past medical history:    has a past medical history of Hyperlipidemia, Insomnia, and Thyroid disease. Past surgical history:   has a past surgical history that includes  section and brain surgery. Social History:   reports that she has been smoking cigarettes. She has been smoking about 1.00 pack per day. She has never used smokeless tobacco. She reports previous alcohol use. She reports previous drug use. Family history:  family history is not on file. Allergies   Allergen Reactions    Epinephrine Hives    Tartrazine Itching    Dye [Iodides] Hives    Other      Phenergen       Review of Systems:  Review of Systems   Respiratory: Negative for shortness of breath. Cardiovascular: Negative for chest pain, palpitations and leg swelling. Musculoskeletal: Negative. Skin: Negative. Neurological: Negative for dizziness and weakness. All other systems reviewed and are negative.        BP (!) 98/49   Pulse 56   Temp 98 °F (36.7 °C)   Resp 16   Ht 5' 6\" (1.676 m)   Wt 206 lb 9.1 oz (93.7 kg)   SpO2 93%   BMI 33.34 kg/m²   No intake or output data in the 24 hours ending 12/26/21 1507    Physical Exam:  Physical Exam  Constitutional:       Appearance: She is well-developed. Cardiovascular:      Rate and Rhythm: Normal rate and regular rhythm. Pulses: Intact distal pulses. Dorsalis pedis pulses are 2+ on the right side and 2+ on the left side. Posterior tibial pulses are 2+ on the right side and 2+ on the left side. Heart sounds: Normal heart sounds, S1 normal and S2 normal.   Pulmonary:      Effort: Pulmonary effort is normal.      Breath sounds: Normal breath sounds. Musculoskeletal:         General: Normal range of motion. Skin:     General: Skin is warm and dry. Neurological:      Mental Status: She is alert and oriented to person, place, and time. Medications:    levothyroxine  100 mcg Oral Daily    cefTRIAXone (ROCEPHIN) IV  1,000 mg IntraVENous Q24H    aspirin  81 mg Oral Daily    pravastatin  40 mg Oral Nightly    sodium chloride flush  5-40 mL IntraVENous 2 times per day    enoxaparin  40 mg SubCUTAneous Daily      sodium chloride 25 mL (12/24/21 1809)     traZODone, sodium chloride flush, sodium chloride, ondansetron **OR** ondansetron, polyethylene glycol, acetaminophen **OR** acetaminophen    Lab Data:  CBC:   Recent Labs     12/24/21  0406   WBC 6.5   HGB 13.0   HCT 40.0   MCV 90.1        BMP:   Recent Labs     12/24/21  0406 12/25/21  0231 12/26/21  0450    137 139   K 4.2 4.2 4.2    104 106   CO2 23 23 24   BUN 12 13 14   CREATININE 0.8 0.9 0.9     PT/INR: No results for input(s): PROTIME, INR in the last 72 hours. BNP:  No results for input(s): PROBNP in the last 72 hours. TROPONIN: No results for input(s): TROPONINT in the last 72 hours. Impression:  Active Problems:    Syncope and collapse  Resolved Problems:    * No resolved hospital problems.  *       All labs, medications and tests reviewed by

## 2021-12-26 NOTE — PROGRESS NOTES
Hospitalist Progress Note      Name:  James Raygoza /Age/Sex: 1950  (73 y.o. female)   MRN & CSN:  [id number] & [id number] Admission Date/Time: 2021  2:49 PM   Location:  [address] [place] [address]-A PCP: No primary care provider on file. Hospital Day: 4    Assessment and Plan:       Neal Mcneill a 73 y.o.  female  who presents with generalized weakness and reported syncopal episode to [place]     1. Syncope may be vasovagal/orthostatic hypotension/possible arrhythmias.  Systolic blood pressure this morning 122/57.    Blood pressures have remained stable since admission. Received IV fluid bolus on the ER-fluids held on admission due to interstitial edema noted on chest x-ray. CTA reviewed-no acute findings noted, troponin negative  -2D echocardiogram-pending ()-no prior echo on file  -Carotid Doppler- no stenosis  -Continue telemetry monitoring  -Monitor orthostatic blood pressure  -Cardiology consulted, appreciate assistance  -Taking p.o. fluid.  -Orthostatic blood pressures reordered not completed on admission.  -Follow-up with cardiology after discharge     2.  Generalized weakness may be physical deconditioning. Patient is a resident of a senior assisted living. UA results-positive nitrates small leukocytes small blood  . Fall precautions  -OT eval pending. -PT eval no requirement for acute care PT discharge from caseload.     3. Acute cystitis with hematuria: UA resulted positive nitrates, small leukocytes small blood  -Rocephin 1 g daily  -Follow urine culture results pending()     4. Hypothyroidism-TSH 0.041-Free T4 pending  -continue levothyroxine currently on 137 mcg daily     5.  Hyperlipidemia-continue statins     BMI: 33.34-lifestyle modifications    Dispo: Echo pending, clearance from cardiology prior to discharge.        Diet: ADULT DIET;  Regular  DVT prophylaxis: Lovenox  GI prophylaxis H2 blocker  CODE STATUS:Full Code    Treatment and discharge plan discussed with patient/family. Patient/family voiced understanding. This patient was seen examined and treatment plan discussed with/supervising physician. A hospitalist attending physician was available for questions/consultation as needed          History of Present Illness:     Chief Complaint: Syncopal episode and hypotension    Subjective  Patient examined at bedside this morning. Patient states she has ambulated to the bathroom difficulty. Denies any lightheadedness or dizziness. Denies any chest pain palpitations states \"I feel really good \". Patient denies any new concerns. Discussed with patient weight and on echo of her heart to be completed before she is considered for discharge. Patient lives at assisted living facility. ROS reviewed negative, unless as noted above    Objective:   No intake or output data in the 24 hours ending 12/26/21 1027   Vitals:   Vitals:    12/26/21 0900   BP: (!) 110/51   Pulse: 54   Resp: 16   Temp: 97.3 °F (36.3 °C)   SpO2: 95%     Physical Exam:   Physical Exam  HENT:      Mouth/Throat:      Mouth: Mucous membranes are moist.   Eyes:      Extraocular Movements: Extraocular movements intact. Conjunctiva/sclera: Conjunctivae normal.   Cardiovascular:      Rate and Rhythm: Regular rhythm. Pulses: Normal pulses. Heart sounds: Normal heart sounds. Pulmonary:      Effort: Pulmonary effort is normal.      Breath sounds: Normal breath sounds. Abdominal:      Palpations: Abdomen is soft. Musculoskeletal:         General: Normal range of motion. Skin:     General: Skin is warm and dry. Neurological:      Mental Status: She is alert and oriented to person, place, and time.    Psychiatric:         Mood and Affect: Mood normal.         Medications:   Medications:    levothyroxine  100 mcg Oral Daily    cefTRIAXone (ROCEPHIN) IV  1,000 mg IntraVENous Q24H    aspirin  81 mg Oral Daily    pravastatin  40 mg Oral Nightly    sodium chloride flush  5-40 mL IntraVENous 2 times per day    enoxaparin  40 mg SubCUTAneous Daily      Infusions:    sodium chloride 25 mL (12/24/21 1809)     PRN Meds: traZODone, 50 mg, Nightly PRN  sodium chloride flush, 5-40 mL, PRN  sodium chloride, 25 mL, PRN  ondansetron, 4 mg, Q8H PRN   Or  ondansetron, 4 mg, Q6H PRN  polyethylene glycol, 17 g, Daily PRN  acetaminophen, 650 mg, Q6H PRN   Or  acetaminophen, 650 mg, Q6H PRN        Recent Labs     12/23/21  1502 12/24/21  0406   WBC 7.6 6.5   HGB 12.3* 13.0   HCT 38.7 40.0    215      Recent Labs     12/24/21  0406 12/25/21  0231 12/26/21  0450    137 139   K 4.2 4.2 4.2    104 106   CO2 23 23 24   BUN 12 13 14   CREATININE 0.8 0.9 0.9     Recent Labs     12/23/21  1502   AST 13*   ALT 10   BILITOT 0.4   ALKPHOS 46     No results for input(s): INR in the last 72 hours.   Recent Labs     12/23/21  1502   [address] [facility] [address] <0.010        Imaging reviewed      Electronically signed by RADHA Paige CNP on 12/26/2021 at 10:27 AM

## 2021-12-27 VITALS
TEMPERATURE: 98.4 F | HEART RATE: 60 BPM | BODY MASS INDEX: 33.2 KG/M2 | SYSTOLIC BLOOD PRESSURE: 114 MMHG | DIASTOLIC BLOOD PRESSURE: 61 MMHG | OXYGEN SATURATION: 95 % | HEIGHT: 66 IN | RESPIRATION RATE: 14 BRPM | WEIGHT: 206.57 LBS

## 2021-12-27 LAB
ANION GAP SERPL CALCULATED.3IONS-SCNC: 8 MMOL/L (ref 4–16)
BUN BLDV-MCNC: 16 MG/DL (ref 6–23)
CALCIUM SERPL-MCNC: 9.2 MG/DL (ref 8.3–10.6)
CHLORIDE BLD-SCNC: 104 MMOL/L (ref 99–110)
CO2: 25 MMOL/L (ref 21–32)
CREAT SERPL-MCNC: 0.8 MG/DL (ref 0.6–1.1)
GFR AFRICAN AMERICAN: >60 ML/MIN/1.73M2
GFR NON-AFRICAN AMERICAN: >60 ML/MIN/1.73M2
GLUCOSE BLD-MCNC: 92 MG/DL (ref 70–99)
LV EF: 55 %
LVEF MODALITY: NORMAL
POTASSIUM SERPL-SCNC: 4.2 MMOL/L (ref 3.5–5.1)
SODIUM BLD-SCNC: 137 MMOL/L (ref 135–145)

## 2021-12-27 PROCEDURE — 80048 BASIC METABOLIC PNL TOTAL CA: CPT

## 2021-12-27 PROCEDURE — 99214 OFFICE O/P EST MOD 30 MIN: CPT | Performed by: INTERNAL MEDICINE

## 2021-12-27 PROCEDURE — G0378 HOSPITAL OBSERVATION PER HR: HCPCS

## 2021-12-27 PROCEDURE — 36415 COLL VENOUS BLD VENIPUNCTURE: CPT

## 2021-12-27 PROCEDURE — 93306 TTE W/DOPPLER COMPLETE: CPT

## 2021-12-27 PROCEDURE — 94761 N-INVAS EAR/PLS OXIMETRY MLT: CPT

## 2021-12-27 PROCEDURE — 85025 COMPLETE CBC W/AUTO DIFF WBC: CPT

## 2021-12-27 PROCEDURE — APPSS60 APP SPLIT SHARED TIME 46-60 MINUTES: Performed by: NURSE PRACTITIONER

## 2021-12-27 RX ORDER — LEVOTHYROXINE SODIUM 0.1 MG/1
100 TABLET ORAL DAILY
Qty: 30 TABLET | Refills: 0 | Status: SHIPPED | OUTPATIENT
Start: 2021-12-27

## 2021-12-27 RX ORDER — NITROFURANTOIN 25; 75 MG/1; MG/1
100 CAPSULE ORAL 2 TIMES DAILY
Qty: 14 CAPSULE | Refills: 0 | Status: SHIPPED | OUTPATIENT
Start: 2021-12-27 | End: 2022-01-03

## 2021-12-27 ASSESSMENT — ENCOUNTER SYMPTOMS: SHORTNESS OF BREATH: 0

## 2021-12-27 NOTE — PROGRESS NOTES
Cardiology Progress Note     Today's Plan: will sign off     Admit Date:  2021    Consult reason/ Seen today for: syncope     Subjective and  Overnight Events:  Patient denies any more episodes of syncope or dizziness. Assessment / Plan / Recommendation:     1. Syncope /Dizziness : normal orthostatic B/P, recommend compression stockings, sinus bradycardia (outpatient 2 week event monitor). No acute findings in CTA, carotid doppler. Likely, secondary to acute infection and deconditioning. Normal Echo.   2. Dyslipidemia: continue statins  3. Acute cystitis: cultures pending, on antibiotics. 4. DVT prophylaxis if not contraindicated. Patient can be from cardiac standpoint. Office will notify patient to get event monitor placed. History of Presenting Illness:    Chief complain on admission : 73 y. o.year old who is admitted for  Chief Complaint   Patient presents with    Hypotension     80s over 46s        Past medical history:    has a past medical history of Hyperlipidemia, Insomnia, and Thyroid disease. Past surgical history:   has a past surgical history that includes  section and brain surgery. Social History:   reports that she has been smoking cigarettes. She has been smoking about 1.00 pack per day. She has never used smokeless tobacco. She reports previous alcohol use. She reports previous drug use. Family history:  family history is not on file. Allergies   Allergen Reactions    Epinephrine Hives    Tartrazine Itching    Dye [Iodides] Hives    Other      Phenergen       Review of Systems:  Review of Systems   Respiratory: Negative for shortness of breath. Cardiovascular: Negative for chest pain, palpitations and leg swelling. Musculoskeletal: Negative. Skin: Negative. Neurological: Negative for dizziness and weakness. All other systems reviewed and are negative.        BP 137/73   Pulse 51   Temp 98.1 °F (36.7 °C) (Oral)   Resp 14   Ht 5' 6\" (1.676 m)   Wt 206 lb 9.1 oz (93.7 kg)   SpO2 92%   BMI 33.34 kg/m²   No intake or output data in the 24 hours ending 12/27/21 1228    Physical Exam:  Physical Exam  Constitutional:       Appearance: She is well-developed. Cardiovascular:      Rate and Rhythm: Normal rate and regular rhythm. Pulses: Intact distal pulses. Dorsalis pedis pulses are 2+ on the right side and 2+ on the left side. Posterior tibial pulses are 2+ on the right side and 2+ on the left side. Heart sounds: Normal heart sounds, S1 normal and S2 normal.   Pulmonary:      Effort: Pulmonary effort is normal.      Breath sounds: Normal breath sounds. Musculoskeletal:         General: Normal range of motion. Skin:     General: Skin is warm and dry. Neurological:      Mental Status: She is alert and oriented to person, place, and time. Medications:    levothyroxine  100 mcg Oral Daily    cefTRIAXone (ROCEPHIN) IV  1,000 mg IntraVENous Q24H    aspirin  81 mg Oral Daily    pravastatin  40 mg Oral Nightly    sodium chloride flush  5-40 mL IntraVENous 2 times per day    enoxaparin  40 mg SubCUTAneous Daily      sodium chloride 25 mL (12/24/21 1809)     traZODone, sodium chloride flush, sodium chloride, ondansetron **OR** ondansetron, polyethylene glycol, acetaminophen **OR** acetaminophen    Lab Data:  CBC:   No results for input(s): WBC, HGB, HCT, MCV, PLT in the last 72 hours. BMP:   Recent Labs     12/25/21  0231 12/26/21  0450 12/27/21  0148    139 137   K 4.2 4.2 4.2    106 104   CO2 23 24 25   BUN 13 14 16   CREATININE 0.9 0.9 0.8     PT/INR: No results for input(s): PROTIME, INR in the last 72 hours. BNP:  No results for input(s): PROBNP in the last 72 hours. TROPONIN: No results for input(s): TROPONINT in the last 72 hours.      Impression:  Active Problems:    Syncope and collapse  Resolved Problems:    * No resolved hospital problems. *       All labs, medications and tests reviewed by myself, continue all other medications of all above medical condition listed as is except for changes mentioned above. Thank you   Please call with questions. Electronically signed by Yadi Montano. Squire Boast, APRN - CNP on 12/27/2021 at 12:28 PM     I have seen ,spoken to  and examined this patient personally, independently of the nurse practitioner. I have reviewed the hospital care given to date and reviewed all pertinent labs and imaging. The plan was developed mutually at the time of the visit with the patient,  NP  and myself. I have spoken with patient, nursing staff and provided written and verbal instructions . The above note has been reviewed and I agree with the assessment, diagnosis, and treatment plan with changes made by me as follows     CARDIOLOGY ATTENDING ADDENDUM    HPI:  I have reviewed the above HPI  And agree with above   Gomez Little is a 73 y. o.year old who and presents with had concerns including Hypotension (80s over 46s). Chief Complaint   Patient presents with    Hypotension     80s over 50s     Interval history:  No dizziness    Physical Exam:  General:  Awake, alert, NAD  Head:normal  Eye:normal  Neck:  No JVD   Chest:  Clear to auscultation, respiration easy  Cardiovascular:  RRR S1S2  Abdomen:   nontender  Extremities:  no edema  Pulses; palpable  Neuro: grossly normal      MEDICAL DECISION MAKING;    I agree with the above plan, which was planned by myself and discussed with NP.   Stable cardiac stautus  Will fu as needed        Latoya Delacruz MD [facility]

## 2021-12-27 NOTE — DISCHARGE SUMMARY
Discharge Summary    Name:  Lorrayne Opitz /Age/Sex: 1950  (73 y.o. female)   MRN & CSN:  [id number] & [id number] Admission Date/Time: 2021  2:49 PM   Attending:  MD Dc Discharging Physician: RADHA [facility] Course:   Lorrayne Opitz is a 73 y.o.  female  who presents with Syncope and collapse from senior assisted living. She reports symptoms of generalized weakness. Patient was admitted for further evaluation and treatment vasovagal event versus orthostatic hypotension versus possible arrhythmias. Patient was evaluated by cardiology. Orthostatics blood pressure was normal.  Recommended compression stockings, episodes of sinus bradycardia recommended 2-week outpatient event monitor and will follow up with her as an outpatient. CTA head and neck and carotid Doppler had no acute findings. Echo was normal.  Patient was found to have acute UTI. UA with reflex to culture resulted positive nitrates. Culture was not completed by microbiology. Patient was started on Rocephin 1 g daily transition to [address] 2 times a day to complete antibiotic course as outpatient. Patient had no adverse events during her hospitalization. The patient expressed appropriate understanding of and agreement with the discharge recommendations, medications, and plan. Consults this admission:  IP CONSULT TO HOSPITALIST  IP CONSULT TO CARDIOLOGY  IP CONSULT TO IV TEAM    Discharge Instruction:   Follow up appointments: Primary care doctor, cardiology  Primary care physician:  within 2 weeks  New PCP list was added to discharge papers if patient does not have her own.     Diet:  regular diet   Activity: activity as tolerated  Disposition: Discharged to:   [x]Home, []C, []SNF, []Acute Rehab, []Hospice , senior assisted living  Condition on discharge: Stable    Discharge Medications:        Medication List      START taking these medications    nitrofurantoin (macrocrystal-monohydrate) 100 MG capsule  Commonly known as: MACROBID  Take 1 capsule by mouth 2 times daily for 7 days        CHANGE how you take these medications    levothyroxine 100 MCG tablet  Commonly known as: SYNTHROID  Take 1 tablet by mouth Daily  What changed:   · medication strength  · how much to take        CONTINUE taking these medications    aspirin 81 MG EC tablet     pravastatin 40 MG tablet  Commonly known as: PRAVACHOL     traZODone 50 MG tablet  Commonly known as: DESYREL           Where to Get Your Medications      These medications were sent to [address] [place] [address], [address] [facility] [address] [address]    Phone: [phone]   · levothyroxine 100 MCG tablet  · nitrofurantoin (macrocrystal-monohydrate) 100 MG capsule         Objective Findings at Discharge:   /61   Pulse 60   Temp 98.4 °F (36.9 °C) (Oral)   Resp 14   Ht 5' 6\" (1.676 m)   Wt 206 lb 9.1 oz (93.7 kg)   SpO2 95%   BMI 33.34 kg/m²            PHYSICAL EXAM   GEN Awake female, sitting upright in bed in no apparent distress. Appears given age. EYES Pupils are equally round. No scleral erythema, discharge, or conjunctivitis. HENT Mucous membranes are moist  NECK Supple, no apparent thyromegaly or masses. RESP Clear to auscultation, no wheezes, rales or rhonchi. Symmetric chest movement while on room air. CARDIO/VASC S1/S2 auscultated. Regular rate without appreciable murmurs, rubs, or gallops. No JVD or carotid bruits. Peripheral pulses equal bilaterally and palpable. No peripheral edema. GI Abdomen is soft without significant tenderness, masses, or guarding. Bowel sounds are normoactive.  No costovertebral angle tenderness. MSK No gross joint deformities. SKIN Normal coloration, warm, dry. NEURO Cranial nerves appear grossly intact, normal speech, no lateralizing weakness. PSYCH Awake, alert, oriented x 4.   Affect appropriate.     BMP/CBC  Recent Labs     12/25/21  0231 12/26/21  0450 12/27/21  0148    139 137   K 4.2 4.2 4.2    106 104   CO2 23 24 25   BUN 13 14 16   CREATININE 0.9 0.9 0.8       IMAGING:  Carotid Doppler bilateral  CT cervical spine without contrast  CT head without contrast  Chest x-ray    Discharge Time of 35 minutes    Electronically signed by RADHA Syed CNP on 12/27/2021 at 4:23 PM

## 2021-12-27 NOTE — PROGRESS NOTES
Occupational Therapy    Per therapy triage process, the OT referral has been discharged. Pt was evaluated by PT and discharged due to performing at functional baseline. Pt does not present w/ skilled OT therapy needs at this time.  Please re-order in future if pt requires skilled therapy services    Radah ANNA/L [id number]  7:09 AM,12/27/2021

## 2021-12-27 NOTE — CARE COORDINATION
Reviewed chart and spoke with pt about discharge needs/plans. Pt states she is from [address] [facility] apt at [facility]. She states plan will be to return once medically ready. 1500 Pt discharged back to [facility], spoke with Maury Sommer and faxed [phone], they can take pt back any time. Set up with [facility] transport , they will be here between 1530 and 1600. Updated Maury Sommer and Samreen pt's nurse.

## 2021-12-28 LAB
CULTURE: NORMAL
CULTURE: NORMAL
Lab: NORMAL
Lab: NORMAL
SPECIMEN: NORMAL
SPECIMEN: NORMAL

## 2022-01-07 ENCOUNTER — TELEPHONE (OUTPATIENT)
Dept: CARDIOLOGY CLINIC | Age: 72
End: 2022-01-07

## 2022-01-07 NOTE — TELEPHONE ENCOUNTER
Called pt about setting appt for 2wk holter. Unable to leave message as phone rang until call was disconnected.  DAYSI 1/7/22

## 2022-02-11 ENCOUNTER — APPOINTMENT (OUTPATIENT)
Dept: CT IMAGING | Age: 72
End: 2022-02-11
Payer: MEDICARE

## 2022-02-11 ENCOUNTER — APPOINTMENT (OUTPATIENT)
Dept: GENERAL RADIOLOGY | Age: 72
End: 2022-02-11
Payer: MEDICARE

## 2022-02-11 ENCOUNTER — HOSPITAL ENCOUNTER (EMERGENCY)
Age: 72
Discharge: OTHER INSTITUTION | End: 2022-02-12
Attending: EMERGENCY MEDICINE
Payer: MEDICARE

## 2022-02-11 VITALS
HEART RATE: 78 BPM | BODY MASS INDEX: 33.27 KG/M2 | RESPIRATION RATE: 14 BRPM | WEIGHT: 207 LBS | DIASTOLIC BLOOD PRESSURE: 54 MMHG | TEMPERATURE: 97.6 F | SYSTOLIC BLOOD PRESSURE: 108 MMHG | OXYGEN SATURATION: 97 % | HEIGHT: 66 IN

## 2022-02-11 DIAGNOSIS — R19.5 ABNORMAL STOOL COLOR: ICD-10-CM
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
## 2022-02-11 LAB
ALBUMIN SERPL-MCNC: 3 GM/DL (ref 3.4–5)
ALP BLD-CCNC: 69 IU/L (ref 40–129)
ALT SERPL-CCNC: 12 U/L (ref 10–40)
ANION GAP SERPL CALCULATED.3IONS-SCNC: 10 MMOL/L (ref 4–16)
APTT: 30.2 SECONDS (ref 25.1–37.1)
AST SERPL-CCNC: 17 IU/L (ref 15–37)
BACTERIA: ABNORMAL /HPF
BASOPHILS ABSOLUTE: 0 K/CU MM
BASOPHILS RELATIVE PERCENT: 0.5 % (ref 0–1)
BILIRUB SERPL-MCNC: 0.5 MG/DL (ref 0–1)
BILIRUBIN URINE: ABNORMAL MG/DL
BLOOD, URINE: NEGATIVE
BUN BLDV-MCNC: 10 MG/DL (ref 6–23)
CALCIUM SERPL-MCNC: 8.7 MG/DL (ref 8.3–10.6)
CHLORIDE BLD-SCNC: 101 MMOL/L (ref 99–110)
CLARITY: ABNORMAL
CO2: 26 MMOL/L (ref 21–32)
COLOR: YELLOW
CREAT SERPL-MCNC: 0.7 MG/DL (ref 0.6–1.1)
DIFFERENTIAL TYPE: ABNORMAL
EKG ATRIAL RATE: 67 BPM
EKG DIAGNOSIS: NORMAL
EKG P AXIS: 68 DEGREES
EKG P-R INTERVAL: 168 MS
EKG Q-T INTERVAL: 394 MS
EKG QRS DURATION: 74 MS
EKG QTC CALCULATION (BAZETT): 416 MS
EKG R AXIS: 13 DEGREES
EKG T AXIS: 15 DEGREES
EKG VENTRICULAR RATE: 67 BPM
EOSINOPHILS ABSOLUTE: 0 K/CU MM
EOSINOPHILS RELATIVE PERCENT: 0.7 % (ref 0–3)
GFR AFRICAN AMERICAN: >60 ML/MIN/1.73M2
GFR NON-AFRICAN AMERICAN: >60 ML/MIN/1.73M2
GLUCOSE BLD-MCNC: 78 MG/DL (ref 70–99)
GLUCOSE, URINE: NEGATIVE MG/DL
HCT VFR BLD CALC: 41.1 % (ref 37–47)
HEMOCCULT SP1 STL QL: NEGATIVE
HEMOGLOBIN: 13 GM/DL (ref 12.5–16)
ICTOTEST: NEGATIVE
IMMATURE NEUTROPHIL %: 0.3 % (ref 0–0.43)
INR BLD: 0.98 INDEX
KETONES, URINE: NEGATIVE MG/DL
LEUKOCYTE ESTERASE, URINE: NEGATIVE
LYMPHOCYTES ABSOLUTE: 0.7 K/CU MM
LYMPHOCYTES RELATIVE PERCENT: 11.7 % (ref 24–44)
MAGNESIUM: 2 MG/DL (ref 1.8–2.4)
MCH RBC QN AUTO: 28.4 PG (ref 27–31)
MCHC RBC AUTO-ENTMCNC: 31.6 % (ref 32–36)
MCV RBC AUTO: 89.9 FL (ref 78–100)
MONOCYTES ABSOLUTE: 0.1 K/CU MM
MONOCYTES RELATIVE PERCENT: 2.3 % (ref 0–4)
MUCUS: ABNORMAL HPF
NITRITE URINE, QUANTITATIVE: POSITIVE
NUCLEATED RBC %: 0 %
PDW BLD-RTO: 13.8 % (ref 11.7–14.9)
PH, URINE: 7.5 (ref 5–8)
PLATELET # BLD: 212 K/CU MM (ref 140–440)
PMV BLD AUTO: 10.1 FL (ref 7.5–11.1)
POTASSIUM SERPL-SCNC: 3.5 MMOL/L (ref 3.5–5.1)
PROTEIN UA: ABNORMAL MG/DL
PROTHROMBIN TIME: 12.6 SECONDS (ref 11.7–14.5)
RBC # BLD: 4.57 M/CU MM (ref 4.2–5.4)
RBC URINE: 3 /HPF (ref 0–6)
SEGMENTED NEUTROPHILS ABSOLUTE COUNT: 5.2 K/CU MM
SEGMENTED NEUTROPHILS RELATIVE PERCENT: 84.5 % (ref 36–66)
SODIUM BLD-SCNC: 137 MMOL/L (ref 135–145)
SPECIFIC GRAVITY UA: 1.01 (ref 1–1.03)
TOTAL IMMATURE NEUTOROPHIL: 0.02 K/CU MM
TOTAL NUCLEATED RBC: 0 K/CU MM
TOTAL PROTEIN: 5.8 GM/DL (ref 6.4–8.2)
TROPONIN T: <0.01 NG/ML
TSH HIGH SENSITIVITY: 0.03 UIU/ML (ref 0.27–4.2)
UROBILINOGEN, URINE: 1 MG/DL (ref 0.2–1)
WBC # BLD: 6.2 K/CU MM (ref 4–10.5)
WBC UA: 3 /HPF (ref 0–5)

## 2022-02-11 PROCEDURE — 86900 BLOOD TYPING SEROLOGIC ABO: CPT

## 2022-02-11 PROCEDURE — 72125 CT NECK SPINE W/O DYE: CPT

## 2022-02-11 PROCEDURE — 84484 ASSAY OF TROPONIN QUANT: CPT

## 2022-02-11 PROCEDURE — 93010 ELECTROCARDIOGRAM REPORT: CPT | Performed by: INTERNAL MEDICINE

## 2022-02-11 PROCEDURE — 83735 ASSAY OF MAGNESIUM: CPT

## 2022-02-11 PROCEDURE — 93005 ELECTROCARDIOGRAM TRACING: CPT | Performed by: PHYSICIAN ASSISTANT

## 2022-02-11 PROCEDURE — 81001 URINALYSIS AUTO W/SCOPE: CPT

## 2022-02-11 PROCEDURE — 99284 EMERGENCY DEPT VISIT MOD MDM: CPT

## 2022-02-11 PROCEDURE — 80053 COMPREHEN METABOLIC PANEL: CPT

## 2022-02-11 PROCEDURE — 85610 PROTHROMBIN TIME: CPT

## 2022-02-11 PROCEDURE — 71045 X-RAY EXAM CHEST 1 VIEW: CPT

## 2022-02-11 PROCEDURE — 2580000003 HC RX 258: Performed by: EMERGENCY MEDICINE

## 2022-02-11 PROCEDURE — 84443 ASSAY THYROID STIM HORMONE: CPT

## 2022-02-11 PROCEDURE — 70450 CT HEAD/BRAIN W/O DYE: CPT

## 2022-02-11 PROCEDURE — 72170 X-RAY EXAM OF PELVIS: CPT

## 2022-02-11 PROCEDURE — 85730 THROMBOPLASTIN TIME PARTIAL: CPT

## 2022-02-11 PROCEDURE — G0328 FECAL BLOOD SCRN IMMUNOASSAY: HCPCS

## 2022-02-11 PROCEDURE — 85025 COMPLETE CBC W/AUTO DIFF WBC: CPT

## 2022-02-11 PROCEDURE — 86901 BLOOD TYPING SEROLOGIC RH(D): CPT

## 2022-02-11 PROCEDURE — 86850 RBC ANTIBODY SCREEN: CPT

## 2022-02-11 PROCEDURE — 82270 OCCULT BLOOD FECES: CPT

## 2022-02-11 RX ORDER — 0.9 % SODIUM CHLORIDE 0.9 %
1000 INTRAVENOUS SOLUTION INTRAVENOUS ONCE
Status: COMPLETED | OUTPATIENT
Start: 2022-02-11 | End: 2022-02-11

## 2022-02-11 RX ADMIN — SODIUM CHLORIDE 1000 ML: 9 INJECTION, SOLUTION INTRAVENOUS at 13:03

## 2022-02-11 NOTE — ED NOTES
Bed: H-02  Expected date: 2/11/22  Expected time:   Means of arrival: Ambulance  Comments:     Felipe Truong.  Eran, HYUN  02/11/22 4814

## 2022-02-11 NOTE — CARE COORDINATION
CM consult to assist discharge planning for pt who is from independent living to Skilled, requested from [facility] facility per Lorraine Ramírez. Pt sent from [facility] for a fall, [facility] point ask that pt returned to the Skilled unit of [facility]. This information was given to Lorraine Ramírez from [address] [phone] who works on assisted living side. CM called admission for SNF [facility] [phone], no answer, message left for a call back. Call to [address] [phone], no answer message left. Attempted to call Assisted living main # [phone] no answer. Received a call back from admissions personal Joe Lopez, SNF admission, she was unaware of request for pt coming from Assisted living to SNF. THis CM explained pt has been medically cleared for discharge back to assisted living. For pt to go to SNF Assisted living should have mentioned this prior to pt cleared for discharge and Marc Durham RN calling report to [facility] LPN who apparently decided at that moment pt needed SNF. [facility] SNF admissions rep understands that pt is coming back to Assited living. Pt can not just move to their sNF side with out  Insurance pre-cert. POC is for discharge back to assisted living facility where pt is from. Kelsea MCDONALD taking care of pt at [facility] unaware of how process works for pt to go to a different facility. Marc Durham RN is aware that she has called report to pt assisted living facility at [facility]. Pt is already cleared for discharge and will be returning to assisted living where pt resides. Pt ready for discharge.  HYUN BRICENO/CM

## 2022-02-11 NOTE — ED PROVIDER NOTES
EMERGENCY DEPARTMENT ENCOUNTER    Patient: Gita Brady  MRN: [id number]  : 1950  Date of Evaluation: 2022  ED Provider:  Arsh Patiño MD    CHIEF COMPLAINT  Chief Complaint   Patient presents with   Sarita Brisk    Other     dark stool       HPI  Gita Brady is a 73 y.o. female who presents from nursing facility after reported fall today. Patient states that she tripped over her feet and fell and landed on her right knee. She has some mild aching pain in the right knee but states is nothing significant. She denies pain anywhere else. Denies severe head or any loss consciousness. Has reportedly had several falls recently denies any injuries or pain from those as well. There also reports that the patient has been having some dark bowel movements. Is unclear how long this has been going on for. She denies any bright red blood. Denies abdominal pain. Denies being on any blood thinning medications. Denies any other associated symptoms or complaints or concerns.       REVIEW OF SYSTEMS    Constitutional: negative for fever, chills  Neurological: negative for HA, focal weakness, loss of sensation  Ophthalmic: negative for vision change  ENT: negative for congestion, rhinorrhea  Cardiovascular: negative for chest pain  Respiratory: negative for SOB, cough  GI: negative for abdominal pain, nausea, vomiting, diarrhea, constipation  : negative for dysuria, hematuria  Musculoskeletal: negative for myalgias, decreased ROM, joint swelling  Dermatological: negative for rash, wounds  Heme: Negative for bleeding, bruising      PAST MEDICAL HISTORY  Past Medical History:   Diagnosis Date    Hyperlipidemia     Insomnia     Thyroid disease     hypo       CURRENT MEDICATIONS  [unfilled]    ALLERGIES  Allergies   Allergen Reactions    Epinephrine Hives    Tartrazine Itching    Dye [Iodides] Hives    Other      Phenergen       SURGICAL HISTORY  Past Surgical History:   Procedure Laterality Date    BRAIN SURGERY       SECTION         FAMILY HISTORY  No family history on file. SOCIAL HISTORY  Social History     Socioeconomic History    Marital status: Single     Spouse name: Not on file    Number of children: Not on file    Years of education: Not on file    Highest education level: Not on file   Occupational History    Not on file   Tobacco Use    Smoking status: Current Every Day Smoker     Packs/day: 1.00     Types: Cigarettes    Smokeless tobacco: Never Used   Vaping Use    Vaping Use: Never used   Substance and Sexual Activity    Alcohol use: Not Currently    Drug use: Not Currently    Sexual activity: Not on file   Other Topics Concern    Not on file   Social History Narrative    Not on file     Social Determinants of Health     Financial Resource Strain:     Difficulty of Paying Living Expenses: Not on file   Food Insecurity:     Worried About Running Out of Food in the Last Year: Not on file    [address] of Food in the Last Year: Not on file   Transportation Needs:     Lack of Transportation (Medical): Not on file    Lack of Transportation (Non-Medical):  Not on file   Physical Activity:     Days of Exercise per Week: Not on file    Minutes of Exercise per Session: Not on file   Stress:     Feeling of Stress : Not on file   Social Connections:     Frequency of Communication with Friends and Family: Not on file    Frequency of Social Gatherings with Friends and Family: Not on file    Attends [employer] Services: Not on file    Active Member of Clubs or Organizations: Not on file    Attends Club or Organization Meetings: Not on file    Marital Status: Not on file   Intimate Partner Violence:     Fear of Current or Ex-Partner: Not on file    Emotionally Abused: Not on file    Physically Abused: Not on file    Sexually Abused: Not on file   Housing Stability:     Unable to Pay for Housing in the Last Year: Not on file    Number of [place] in the Last Year: Not on file  Unstable Housing in the Last Year: Not on file         **Past medical, family and social histories, and nursing notes reviewed and verified by me**      PHYSICAL EXAM  VITAL SIGNS:   ED Triage Vitals   Enc Vitals Group      BP 02/11/22 1057 99/61      Pulse 02/11/22 1057 70      Resp 02/11/22 1057 14      Temp 02/11/22 1057 97.6 °F (36.4 °C)      Temp Source 02/11/22 1057 Oral      SpO2 02/11/22 1057 98 %      Weight 02/11/22 1045 207 lb (93.9 kg)      Height 02/11/22 1045 5' 6\" (1.676 m)      Head Circumference --       Peak Flow --       Pain Score --       Pain Loc --       Pain Edu? --       Excl. in [address] during ED course were reviewed and are as charted. Constitutional: Minimal distress, Non-toxic appearance  Eyes: Conjunctiva normal, No discharge, PERRL, EOMI  HENT: Normocephalic, Atraumatic, bilateral external ears normal, posterior oropharynx is nonerythematous and without exudate, uvula is midline, oropharynx moist  Neck: Supple, no midline cervical spinal tenderness palpation or palpable deformities or step-offs, no stridor, no grossly visible or palpable masses  Cardiovascular: Regular rate and rhythm, No murmurs, No rubs, No gallops  Pulmonary/Chest: Normal breath sounds, No respiratory distress or accessory muscle use, No wheezing, crackles or rhonchi. Abdomen: Soft, nondistended and nonrigid, No tenderness or peritoneal signs, No masses, normal bowel sounds  Rectal (performed with nurseRobles, present): Normal appearance of the anus. No internal or external masses noted. No evidence for fissures. Normal rectal tone. Stool is light brown in color and without any evidence of gross blood and guaiac negative  Back: No midline point tenderness, No paraspinous muscle tenderness.  No CVA tenderness  Extremities: No gross deformities, no edema, no tenderness  Neurologic: GCS 15, cranial nerves II through XII grossly intact as tested, normal motor function, Normal sensory function, No focal deficits  Skin: Warm, Dry, No erythema, No rash, No cyanosis, No mottling  Psychiatric: Alert and oriented x3, Affect normal          EKG Interpretation    Interpreted by emergency department physician    Rhythm: normal sinus   Rate: normal  Axis: normal  Ectopy: none  Conduction: normal  ST Segments: normal  T Waves: normal  Q Waves: none    Clinical Impression: Normal EKG      RADIOLOGY/PROCEDURES/LABS/MEDICATIONS ADMINISTERED:    I have reviewed and interpreted all of the currently available lab results from this visit (if applicable):  Results for orders placed or performed during the hospital encounter of 02/11/22   CBC Auto Differential   Result Value Ref Range    WBC 6.2 4.0 - 10.5 K/CU MM    RBC 4.57 4.2 - 5.4 M/CU MM    Hemoglobin 13.0 12.5 - 16.0 GM/DL    Hematocrit 41.1 37 - 47 %    MCV 89.9 78 - 100 FL    MCH 28.4 27 - 31 PG    MCHC 31.6 (L) 32.0 - 36.0 %    RDW 13.8 11.7 - 14.9 %    Platelets 105 085 - 010 K/CU MM    MPV 10.1 7.5 - 11.1 FL    Differential Type AUTOMATED DIFFERENTIAL     Segs Relative 84.5 (H) 36 - 66 %    Lymphocytes % 11.7 (L) 24 - 44 %    Monocytes % 2.3 0 - 4 %    Eosinophils % 0.7 0 - 3 %    Basophils % 0.5 0 - 1 %    Segs Absolute 5.2 K/CU MM    Lymphocytes Absolute 0.7 K/CU MM    Monocytes Absolute 0.1 K/CU MM    Eosinophils Absolute 0.0 K/CU MM    Basophils Absolute 0.0 K/CU MM    Nucleated RBC % 0.0 %    Total Nucleated RBC 0.0 K/CU MM    Total Immature Neutrophil 0.02 K/CU MM    Immature Neutrophil % 0.3 0 - 0.43 %   Comprehensive Metabolic Panel w/ Reflex to MG   Result Value Ref Range    Sodium 137 135 - 145 MMOL/L    Potassium 3.5 3.5 - 5.1 MMOL/L    Chloride 101 99 - 110 mMol/L    CO2 26 21 - 32 MMOL/L    BUN 10 6 - 23 MG/DL    CREATININE 0.7 0.6 - 1.1 MG/DL    Glucose 78 70 - 99 MG/DL    Calcium 8.7 8.3 - 10.6 MG/DL    Albumin 3.0 (L) 3.4 - 5.0 GM/DL    Total Protein 5.8 (L) 6.4 - 8.2 GM/DL    Total Bilirubin 0.5 0.0 - 1.0 MG/DL    ALT 12 10 - 40 U/L    AST 17 15 - 37 IU/L    Alkaline Phosphatase 69 40 - 129 IU/L    GFR Non-African American >60 >60 mL/min/1.73m2    GFR African American >60 >60 mL/min/1.73m2    Anion Gap 10 4 - 16   APTT   Result Value Ref Range    aPTT 30.2 25.1 - 37.1 SECONDS   Protime-INR   Result Value Ref Range    Protime 12.6 11.7 - 14.5 SECONDS    INR 0.98 INDEX   Troponin   Result Value Ref Range    Troponin T <0.010 <0.01 NG/ML   Urinalysis Reflex to Culture    Specimen: Urine   Result Value Ref Range    Color, UA YELLOW YELLOW    Clarity, UA CLOUDY (A) CLEAR    Glucose, Urine NEGATIVE NEGATIVE MG/DL    Bilirubin Urine SMALL (A) NEGATIVE MG/DL    Ketones, Urine NEGATIVE NEGATIVE MG/DL    Specific Gravity, UA 1.015 1.001 - 1.035    Blood, Urine NEGATIVE NEGATIVE    pH, Urine 7.5 5.0 - 8.0    Protein, UA TRACE (A) NEGATIVE MG/DL    Urobilinogen, Urine 1.0 0.2 - 1.0 MG/DL    Nitrite Urine, Quantitative POSITIVE (A) NEGATIVE    Leukocyte Esterase, Urine NEGATIVE NEGATIVE    RBC, UA 3 0 - 6 /HPF    WBC, UA 3 0 - 5 /HPF    Bacteria, UA MANY (A) NEGATIVE /HPF    Mucus, UA MANY (A) NEGATIVE HPF   TSH without Reflex   Result Value Ref Range    TSH, High Sensitivity 0.033 (L) 0.270 - 4.20 uIu/ml   Magnesium   Result Value Ref Range    Magnesium 2.0 1.8 - 2.4 mg/dl   Occult blood x 3, stool   Result Value Ref Range    Occult Blood, Stool #1 NEGATIVE NEGATIVE   ICTOTEST, URINE   Result Value Ref Range    Ictotest NEGATIVE    EKG 12 Lead   Result Value Ref Range    Ventricular Rate 67 BPM    Atrial Rate 67 BPM    P-R Interval 168 ms    QRS Duration 74 ms    Q-T Interval 394 ms    QTc Calculation (Bazett) 416 ms    P Axis 68 degrees    R Axis 13 degrees    T Axis 15 degrees    Diagnosis       Normal sinus rhythm  Possible Inferior infarct , age undetermined  Abnormal ECG  When compared with ECG of 23-DEC-2021 15:00,  Nonspecific T wave abnormality, worse in Lateral leads            ABNORMAL LABS:  Labs Reviewed   CBC WITH AUTO DIFFERENTIAL - Abnormal; Notable for changes are seen   as well as mild reversal the normal cervical lordosis which can be related to   muscular strain. Dystrophic appearing calcifications noted within the basal ganglia as well as   the brainstem, nonspecific. Similar to the prior exam.         XR PELVIS (1-2 VIEWS)   Final Result   No acute fracture. XR CHEST PORTABLE   Final Result   No acute cardiopulmonary process. MEDICATIONS ADMINISTERED:  Medications   0.9 % sodium chloride bolus (0 mLs IntraVENous Stopped 2/11/22 1545)         COURSE & MEDICAL DECISION MAKING  Last vitals: BP (!) 108/54   Pulse 78   Temp 97.6 °F (36.4 °C) (Oral)   Resp 14   Ht 5' 6\" (1.676 m)   Wt 207 lb (93.9 kg)   SpO2 97%   BMI 33.41 kg/m²     68-year-old female with mechanical fall. No clinical or radiographic evidence for serious injury. Vital signs reassuring and stable. Is neurovascularly intact. Physical exam unremarkable. There have been reports that the patient has been having very dark stools however on digital rectal exam stool was noted to be very light brown in color and without evidence of blood. Hemoglobin is within normal limits. Laboratory work-up has been rather unremarkable other than she was noted to have some positive nitrates on urinalysis but she does not have any significant pyuria and she is not having any urinary symptoms at all. So, this likely represents asymptomatic bacteriuria which I am not inclined to treat. Additional workup and treatment in the ED as documented above. Patient reassured and will be discharged home. I have explained to the patient in appropriate terminology our work-up in the ED and their diagnosis. I have also given anticipatory guidance and expectant management of their condition as an outpatient as per my custom. The patient was given clear discharge and follow-up instructions including return to the ER immediately for worsening concerns.  The patient has been advised to follow-up with their primary care physician and/or referred physician in the next two to three days or sooner if worsening and to return to the ER immediately as above with any concerns. I provided the patient counseling with regard to my customary list of strict return precautions as well as return precautions specific to the cause for today's emergency department visit. The patient will return under these provided conditions, but should also return for new concerns or further worsening. Pt and/or family understand and agree with plan. Clinical Impression:  1. Fall, initial encounter    2. Abnormal stool color        Disposition referral (if applicable): Your Primary care provider    Schedule an appointment as soon as possible for a visit       [address] [facility] Emergency Department  [facility] [phone]  [phone]    If symptoms worsen      Disposition medications (if applicable):  New Prescriptions    No medications on file       ED Provider Disposition Time  DISPOSITION            Electronically signed by: Rona Smith M.D., 2/11/2022 5:35 PM      This dictation was created with voice recognition software. While attempts have been made to review the dictation as it is transcribed, on occasion the spoken word can be misinterpreted by the technology leading to omissions or inappropriate words, phrases or sentences.         Gigi Gonzalez MD  02/11/22 1736       Gigi Gonzalez MD  02/11/22 1731

## 2022-02-11 NOTE — DISCHARGE INSTR - COC
Continuity of Care Form    Patient Name: Sarah Dubon   :  1950  MRN:  [id number]    Admit date:  2022  Discharge date:  ***    Code Status Order: Prior   Advance Directives:      Admitting Physician:  No admitting provider for patient encounter. PCP: No primary care provider on file. Discharging Nurse: [facility] Unit/Room#: ED16/ED-16  Discharging Unit Phone Number: ***    Emergency Contact:   Extended Emergency Contact Information  Primary Emergency Contact: [address] [place] [address]  Mobile Phone: [phone]  Relation: Brother/Sister   needed? No    Past Surgical History:  Past Surgical History:   Procedure Laterality Date    BRAIN SURGERY       SECTION         Immunization History: There is no immunization history on file for this patient. Active Problems:  Patient Active Problem List   Diagnosis Code    Syncope and collapse R55       Isolation/Infection:   Isolation            No Isolation          Patient Infection Status       None to display            Nurse Assessment:  Last Vital Signs: BP (!) 108/54   Pulse 78   Temp 97.6 °F (36.4 °C) (Oral)   Resp 14   Ht 5' 6\" (1.676 m)   Wt 207 lb (93.9 kg)   SpO2 97%   BMI 33.41 kg/m²     Last documented pain score (0-10 scale):    Last Weight:   Wt Readings from Last 1 Encounters:   22 207 lb (93.9 kg)     Mental Status:  {IP PT MENTAL STATUS:61631}    IV Access:  { ATILIO IV ACCESS:033911197}    Nursing Mobility/ADLs:  Walking   {CHP DME JESK:151331495}  Transfer  {CHP DME LMSM:790991311}  Bathing  {CHP DME TDN}  Dressing  {CHP DME BQEZ:457431008}  Toileting  {CHP DME NDKU:357612443}  Feeding  {CHP DME TAZC:550962903}  Med Admin  {CHP DME FEYW:742486806}  Med Delivery   { ATILIO MED Delivery:997872775}    Wound Care Documentation and Therapy:        Elimination:  Continence:    Bowel: {YES / PC:04055}  Bladder: {YES / UJ:88804}  Urinary Catheter: {Urinary Catheter:953426494} Colostomy/Ileostomy/Ileal Conduit: {YES / S}       Date of Last BM: ***  No intake or output data in the 24 hours ending 22 1710  No intake/output data recorded.     Safety Concerns:     [address] [facility] Safety Concerns:580491075}    Impairments/Disabilities:      [address] [facility] Impairments/Disabilities:991606051}    Nutrition Therapy:  Current Nutrition Therapy:   [facility] Diet List:242691606}    Routes of Feeding: {CHP DME Other Feedings:453202946}  Liquids: {Slp liquid thickness:88407}  Daily Fluid Restriction: {CHP DME Yes amt example:843990931}  Last Modified Barium Swallow with Video (Video Swallowing Test): {Done Not Done SAJO:197884496}    Treatments at the Time of Hospital Discharge:   Respiratory Treatments: ***  Oxygen Therapy:  {Therapy; copd oxygen:79389}  Ventilator:    { CC Vent RXOL:155910860}    Rehab Therapies: {THERAPEUTIC INTERVENTION:9461319694}  Weight Bearing Status/Restrictions: [facility] Weight Bearin}  Other Medical Equipment (for information only, NOT a DME order):  {EQUIPMENT:919297170}  Other Treatments: ***    Patient's personal belongings (please select all that are sent with patient):  {[facility] DME Belongings:806233419}    RN SIGNATURE:  {Esignature:584122585}    CASE MANAGEMENT/SOCIAL WORK SECTION    Inpatient Status Date: ***    Readmission Risk Assessment Score:  Readmission Risk              Risk of Unplanned Readmission:  0           Discharging to Facility/ Agency   Name:   Address:  Phone:  Fax:    Dialysis Facility (if applicable)   Name:  Address:  Dialysis Schedule:  Phone:  Fax:    / signature: {Esignature:135198079}    PHYSICIAN SECTION    Prognosis: {Prognosis:6437416839}    Condition at Discharge: [address] Patient Condition:676586070}    Rehab Potential (if transferring to Rehab): {Prognosis:5671904503}    Recommended Labs or Other Treatments After Discharge: ***    Physician Certification: I certify the above information and transfer of Eleanor Villarreal Gibson  is necessary for the continuing treatment of the diagnosis listed and that she requires {Admit to Appropriate Level of Care:81999} for {GREATER/LESS:761194102} 30 days.      Update Admission H&P: {CHP DME Changes in JWEGV:965588982}    PHYSICIAN SIGNATURE:  {Esignature:192278640}

## 2022-02-11 NOTE — ED PROVIDER NOTES
As provider-in-triage, I performed a medical screening history and physical exam on this patient. HISTORY OF PRESENT ILLNESS  Shayy Mckinnon is a 73 y.o. female who presents for fall yesterday with head injury, second fall today. Lives in assisted living, aides called EMS. Refused transport yesterday. Denies any injuries. EMS reports dark stool. PHYSICAL EXAM  Ht 5' 6\" (1.676 m)   Wt 207 lb (93.9 kg)   BMI 33.41 kg/m²     On exam, the patient appears well-hydrated, well-nourished, and in no acute distress. Mucous membranes are moist. Speech is clear. Breathing is unlabored. Skin is dry. Mental status is normal. Moves all extremities, and is without facial droop. Comment: Please note this report has been produced using speech recognition software and may contain errors related to that system including errors in grammar, punctuation, and spelling, as well as words and phrases that may be inappropriate. If there are any questions or concerns please feel free to contact the dictating provider for clarification.        Annie Almazan PA-C  02/11/22 7280

## 2022-02-12 NOTE — ED NOTES
Pt watching TV, pt absent of acute cardiorespiratory distress @ this time. Pt denies needs, pt states that she is ready to go home. Pt updated on ETA for transport.       Amanda Walters RN  02/11/22 8159

## 2022-02-12 NOTE — ED NOTES
Pt watching TV @ this time, pt absent of acute cardiorespiratory distress @ this time, denies needs @ this time.       García Severino RN  02/11/22 0910

## 2022-02-12 NOTE — ED NOTES
Pt appears to be asleep, pt absent of acute cardiorespiratory distress @ this time.       Taina Boone RN  02/12/22 0005

## 2022-02-12 NOTE — ED NOTES
EMS @ bedside, report provided to EMS team, all questions addressed. Pt care transferred to EMS team without incident.       Kelly Farooq RN  02/12/22 6351